# Patient Record
Sex: FEMALE | Race: WHITE | NOT HISPANIC OR LATINO | Employment: OTHER | URBAN - METROPOLITAN AREA
[De-identification: names, ages, dates, MRNs, and addresses within clinical notes are randomized per-mention and may not be internally consistent; named-entity substitution may affect disease eponyms.]

---

## 2017-11-22 ENCOUNTER — OFFICE VISIT (OUTPATIENT)
Dept: URGENT CARE | Facility: CLINIC | Age: 62
End: 2017-11-22
Payer: OTHER GOVERNMENT

## 2017-11-22 VITALS
SYSTOLIC BLOOD PRESSURE: 160 MMHG | TEMPERATURE: 98.4 F | OXYGEN SATURATION: 94 % | HEART RATE: 97 BPM | DIASTOLIC BLOOD PRESSURE: 74 MMHG | RESPIRATION RATE: 14 BRPM

## 2017-11-22 DIAGNOSIS — L30.9 DERMATITIS: ICD-10-CM

## 2017-11-22 PROCEDURE — 99203 OFFICE O/P NEW LOW 30 MIN: CPT | Performed by: NURSE PRACTITIONER

## 2017-11-22 RX ORDER — LEVOTHYROXINE SODIUM 0.07 MG/1
75 TABLET ORAL
Status: ON HOLD | COMMUNITY
End: 2023-08-08

## 2017-11-22 ASSESSMENT — ENCOUNTER SYMPTOMS
FATIGUE: 0
FEVER: 0
DIZZINESS: 0
SORE THROAT: 0
EYE PAIN: 0
NAUSEA: 0
MYALGIAS: 0
VOMITING: 0
DIARRHEA: 0
SHORTNESS OF BREATH: 0
CHILLS: 0

## 2017-11-22 NOTE — PROGRESS NOTES
Subjective:     Dovemonidavid El a 62 y.o. female who presents for Varicella (pt states she noticed a rash on arms last night and asked the pharmacist and said it was chickenpox and needs a doctors note for work)       Rash   This is a new problem. The current episode started in the past 7 days. The problem has been resolved since onset. The affected locations include the right arm and left arm. The rash is characterized by itchiness and redness. It is unknown if there was an exposure to a precipitant. Pertinent negatives include no diarrhea, eye pain, fatigue, fever, shortness of breath, sore throat or vomiting. Past treatments include anti-itch cream. The treatment provided significant relief.   History reviewed. No pertinent past medical history.  Past Surgical History:   Procedure Laterality Date   • ABDOMINAL HYSTERECTOMY TOTAL       Social History     Social History   • Marital status: Single     Spouse name: N/A   • Number of children: N/A   • Years of education: N/A     Occupational History   • Not on file.     Social History Main Topics   • Smoking status: Current Some Day Smoker   • Smokeless tobacco: Never Used   • Alcohol use Yes   • Drug use:      Types: Marijuana   • Sexual activity: Not Currently     Other Topics Concern   • Not on file     Social History Narrative   • No narrative on file    History reviewed. No pertinent family history. Review of Systems   Constitutional: Negative for chills, fatigue and fever.   HENT: Negative for sore throat.    Eyes: Negative for pain.   Respiratory: Negative for shortness of breath.    Cardiovascular: Negative for chest pain.   Gastrointestinal: Negative for diarrhea, nausea and vomiting.   Genitourinary: Negative for hematuria.   Musculoskeletal: Negative for myalgias.   Skin: Positive for itching and rash.   Neurological: Negative for dizziness.     Allergies   Allergen Reactions   • Codeine    • Iodine       Objective:   /74   Pulse 97    Temp 36.9 °C (98.4 °F)   Resp 14   SpO2 94%   Physical Exam   Constitutional: She is oriented to person, place, and time. She appears well-developed and well-nourished. No distress.   HENT:   Head: Normocephalic and atraumatic.   Eyes: Conjunctivae and EOM are normal. Pupils are equal, round, and reactive to light.   Cardiovascular: Normal rate and regular rhythm.    No murmur heard.  Pulmonary/Chest: Effort normal and breath sounds normal. No respiratory distress.   Abdominal: Soft. She exhibits no distension. There is no tenderness.   Neurological: She is alert and oriented to person, place, and time. She has normal reflexes. No sensory deficit.   Skin: Skin is warm and dry.   Psychiatric: She has a normal mood and affect.         Assessment/Plan:   Assessment    1. Dermatitis  Patient was at work with erythema, pruritic rash on bilateral forearms. She is unknown exposure possible food possible lotion allergy. Patient states that it has happened in the past and resolved with anti-itch cream. Patient was working) evaluated thinking it was possible chickenpox. Patient had chickenpox as a child. She is not immunized against shingles. Rash is completely resolved. Letter provided for work.    Patient advised to monitor blood pressure. Patient advised to keep log. Patient advised to follow up with primary if blood pressure stays elevated.    Patient given precautionary s/sx that mandate immediate follow up and evaluation in the ED. Advised of risks of not doing so.  DDX, Supportive care, and indications for immediate follow-up discussed with patient.    Instructed to return to clinic or nearest emergency department if we are not available for any change in condition, further concerns, or worsening of symptoms.  The patient demonstrated a good understanding and agreed with the treatment plan.

## 2017-11-22 NOTE — LETTER
November 22, 2017         Patient: Rena Pacheco   YOB: 1955   Date of Visit: 11/22/2017           To Whom it May Concern:    Rena Pacheco was seen in my clinic on 11/22/2017. She may return to work on 11/27/17.    If you have any questions or concerns, please don't hesitate to call.        Sincerely,           JADEN Miller.  Electronically Signed

## 2021-03-25 ENCOUNTER — OFFICE VISIT (OUTPATIENT)
Dept: URGENT CARE | Facility: PHYSICIAN GROUP | Age: 66
End: 2021-03-25
Payer: OTHER GOVERNMENT

## 2021-03-25 ENCOUNTER — HOSPITAL ENCOUNTER (OUTPATIENT)
Dept: RADIOLOGY | Facility: MEDICAL CENTER | Age: 66
End: 2021-03-25
Attending: NURSE PRACTITIONER
Payer: OTHER GOVERNMENT

## 2021-03-25 VITALS
SYSTOLIC BLOOD PRESSURE: 162 MMHG | DIASTOLIC BLOOD PRESSURE: 82 MMHG | OXYGEN SATURATION: 94 % | TEMPERATURE: 97.8 F | HEART RATE: 117 BPM

## 2021-03-25 DIAGNOSIS — S46.912A SHOULDER STRAIN, LEFT, INITIAL ENCOUNTER: ICD-10-CM

## 2021-03-25 PROCEDURE — 99203 OFFICE O/P NEW LOW 30 MIN: CPT | Performed by: NURSE PRACTITIONER

## 2021-03-25 PROCEDURE — 73030 X-RAY EXAM OF SHOULDER: CPT | Mod: LT

## 2021-03-25 RX ORDER — CYCLOBENZAPRINE HCL 10 MG
10 TABLET ORAL 3 TIMES DAILY PRN
Qty: 30 TABLET | Refills: 0 | Status: SHIPPED | OUTPATIENT
Start: 2021-03-25 | End: 2023-08-02

## 2021-03-25 RX ORDER — CYCLOBENZAPRINE HCL 10 MG
10 TABLET ORAL 3 TIMES DAILY PRN
Qty: 30 TABLET | Refills: 0 | Status: SHIPPED
Start: 2021-03-25 | End: 2021-03-25 | Stop reason: CLARIF

## 2021-03-26 NOTE — PROGRESS NOTES
Subjective:      Rena Pacheco is a 66 y.o. female who presents with No chief complaint on file.    History reviewed. No pertinent past medical history.  Social History     Socioeconomic History   • Marital status: Single     Spouse name: Not on file   • Number of children: Not on file   • Years of education: Not on file   • Highest education level: Not on file   Occupational History   • Not on file   Tobacco Use   • Smoking status: Current Some Day Smoker   • Smokeless tobacco: Never Used   Substance and Sexual Activity   • Alcohol use: Yes   • Drug use: Yes     Types: Marijuana   • Sexual activity: Not Currently   Other Topics Concern   • Not on file   Social History Narrative   • Not on file     Social Determinants of Health     Financial Resource Strain:    • Difficulty of Paying Living Expenses:    Food Insecurity:    • Worried About Running Out of Food in the Last Year:    • Ran Out of Food in the Last Year:    Transportation Needs:    • Lack of Transportation (Medical):    • Lack of Transportation (Non-Medical):    Physical Activity:    • Days of Exercise per Week:    • Minutes of Exercise per Session:    Stress:    • Feeling of Stress :    Social Connections:    • Frequency of Communication with Friends and Family:    • Frequency of Social Gatherings with Friends and Family:    • Attends [employer] Services:    • Active Member of Clubs or Organizations:    • Attends Club or Organization Meetings:    • Marital Status:    Intimate Partner Violence:    • Fear of Current or Ex-Partner:    • Emotionally Abused:    • Physically Abused:    • Sexually Abused:      History reviewed. No pertinent family history.    Allergies: Codeine and Iodine    Patient is a 66-year-old female who presents today with complaint of acute pain to left shoulder with decreased range of motion.  Onset of symptoms was 2 days ago.  Patient states she spilled some soup on the side of her stove and she wanted to clean it off the next  day.  She attempted to pull the stove away from the wall and felt a sharp pain in her left shoulder.  She has been having increased pain and decreased range of motion since.  States that the pain has been keeping her from sleeping at night.        Other  This is a new problem. The problem occurs constantly. The problem has been unchanged. Nothing aggravates the symptoms. She has tried nothing for the symptoms. The treatment provided no relief.       Review of Systems   Musculoskeletal:        Shoulder pain   All other systems reviewed and are negative.         Objective:     BP (!) 162/82 (BP Location: Right arm, Patient Position: Sitting, BP Cuff Size: Adult)   Pulse (!) 117   Temp 36.6 °C (97.8 °F) (Temporal)   SpO2 94%      Physical Exam  Vitals reviewed.   Constitutional:       Appearance: Normal appearance.   Musculoskeletal:        Arms:       Comments: Point tenderness to the left posterior shoulder.  Also point tenderness to the left anterior shoulder.  Patient is unable to abduct greater than 20 degrees anteriorly or laterally.  She reports pain with all ranges of motion.   Skin:     General: Skin is warm and dry.   Neurological:      General: No focal deficit present.      Mental Status: She is alert and oriented to person, place, and time.   Psychiatric:         Mood and Affect: Mood normal.         Behavior: Behavior normal.         Thought Content: Thought content normal.         Judgment: Judgment normal.       X-ray, shoulder:    IMPRESSION:     1.  Old healed LEFT proximal humeral shaft fracture.  2.  No acute fracture or dislocation of LEFT shoulder.  3.  Moderate degenerative changes.          Assessment/Plan:   Left shoulder strain    Flexeril as needed  Ice as needed  Rest  Order given for MRI; patient may call to schedule if she has persistent pain  Return to urgent care otherwise for any further questions or concerns     There are no diagnoses linked to this encounter.

## 2023-07-25 ENCOUNTER — HOSPITAL ENCOUNTER (OUTPATIENT)
Dept: RADIOLOGY | Facility: MEDICAL CENTER | Age: 68
End: 2023-07-25
Attending: NURSE PRACTITIONER
Payer: MEDICARE

## 2023-07-25 ENCOUNTER — HOSPITAL ENCOUNTER (OUTPATIENT)
Dept: RADIOLOGY | Facility: MEDICAL CENTER | Age: 68
End: 2023-07-25
Attending: INTERNAL MEDICINE
Payer: MEDICARE

## 2023-07-25 ENCOUNTER — PATIENT OUTREACH (OUTPATIENT)
Dept: OTHER | Facility: MEDICAL CENTER | Age: 68
End: 2023-07-25
Payer: MEDICARE

## 2023-07-25 DIAGNOSIS — Z65.8 PSYCHOSOCIAL DISTRESS: ICD-10-CM

## 2023-07-25 NOTE — LETTER
[facility]    [address]  [place] [address]  Phone: [phone] - Fax: [phone]              Rena Jania Pacheco  [address]  [place] [address]     Date: 07/25/23  Medical Record Number: [id number]    Dear Rena,    I am a Cancer Nurse Navigator, a certified oncology nurse. My role is to assess any needs you may have with education, guidance and support. I am available to you and your family through your treatment at [facility].       I am available to address your needs during your journey with the following services:     Care Coordination  I can assist you in facilitating communication between your cancer care treatment team to ensure timely treatment and follow-up.  I can also assist with transition of care back to your primary care provider, or other specialist, as needed.  My goal is to bridge gaps for you throughout the course of your active treatment.       Education Services  Understanding the recommended treatment course by your physician is key. I can provide educational resources personalized to your cancer diagnosis to help you understand your diagnosis and treatment. Please let me know if you would like to receive information about your diagnosis and treatment plan.  I am here to help.     Support Services/Resource Information  [facility] we offer a full scope of support services.  I can assist you with referral information to:  Cancer Clinical Trials & Research  Nutrition counseling  Support groups  Complementary Therapies such as Mind-Body Techniques Meditation  Patient Financial Advocates    [facility], an [employer] affiliate office, our volunteers can assist you with accessing our [employer] library, support services information, head coverings and comfort items  Community and national resources, included eligibility based juaquin assistance and pharmaceutical access programs, if you are in need  of additional information.     [employer] [facility] offers services that include:  Behavioral Health  Genetic counseling & testing  Lymphedema prevention/treatment program  Palliative care services.        I hope you have an excellent patient experience.  Please feel free to share with me your comments regarding the care you have received- we value your feedback.    Sincerely,     Katie Tao R.N.  Cancer Nurse Navigator    Office: [phone] / [phone]   Email:  [email or web address]    If you would like to attend our Breast Cancer Newly Diagnosed Class please call [phone] or visit [email or web address] to register.

## 2023-07-25 NOTE — PROGRESS NOTES
"Oncology Nurse Navigation Assessment  Intro call to pt.  Pt states she is scheduled for surgery on 8/8/23 with Dr. Brown.      DX: invasive ductal carcinoma of right breast  HR+ / HER2 pending    POC: partial mastectomy  / SNB    FAMHX: Cancer-related family history is not on file.             BARRIERS ASSESSMENT:    TRANSPORTATION: denies barriers.  States she has a friend to pick her up day of surgery.  EMPLOYMENT:  retired   FINANCIAL:  denies barriers  INSURANCE:  Medicare / tab ticketbroker  SUPPORT SYSTEM:  friend lives local, family lives in [place]  PSYCHOLOGICAL: distress screening 6/10 \"my son has cut me off.  He does not believe I have cancer.\"  Discussed counseling.   COMMUNICATION: denies barriers    FAMILY CARE:  denies barriers  SELF CARE:  denies barriers         INTERVENTION:  Intro letter and resources mailed.  Referral to Oncology Social Worker for counseling resources.       "

## 2023-07-27 ENCOUNTER — APPOINTMENT (OUTPATIENT)
Dept: ADMISSIONS | Facility: MEDICAL CENTER | Age: 68
End: 2023-07-27
Attending: SURGERY
Payer: MEDICARE

## 2023-07-28 ENCOUNTER — HOSPITAL ENCOUNTER (OUTPATIENT)
Facility: MEDICAL CENTER | Age: 68
End: 2023-07-28
Attending: SURGERY
Payer: MEDICARE

## 2023-08-02 ENCOUNTER — PRE-ADMISSION TESTING (OUTPATIENT)
Dept: ADMISSIONS | Facility: MEDICAL CENTER | Age: 68
End: 2023-08-02
Attending: SURGERY
Payer: MEDICARE

## 2023-08-02 ENCOUNTER — HOSPITAL ENCOUNTER (OUTPATIENT)
Dept: RADIOLOGY | Facility: MEDICAL CENTER | Age: 68
End: 2023-08-02

## 2023-08-02 LAB — PATHOLOGY CONSULT NOTE: NORMAL

## 2023-08-04 ENCOUNTER — HOSPITAL ENCOUNTER (OUTPATIENT)
Dept: RADIOLOGY | Facility: MEDICAL CENTER | Age: 68
End: 2023-08-04
Attending: SURGERY
Payer: MEDICARE

## 2023-08-04 ENCOUNTER — PATIENT OUTREACH (OUTPATIENT)
Dept: ONCOLOGY | Facility: MEDICAL CENTER | Age: 68
End: 2023-08-04
Payer: MEDICARE

## 2023-08-04 DIAGNOSIS — C50.411 MALIGNANT NEOPLASM OF UPPER-OUTER QUADRANT OF RIGHT FEMALE BREAST, UNSPECIFIED ESTROGEN RECEPTOR STATUS (HCC): ICD-10-CM

## 2023-08-04 PROCEDURE — 19285 PERQ DEV BREAST 1ST US IMAG: CPT | Mod: RT

## 2023-08-04 NOTE — PROGRESS NOTES
On August 4, 2023, Oncology Social Worker Shona Baca contacted pt. via telephone.  OSW Keven introduced herself to pt. and reason for call.  Pt. shared she had forgotten about [employer] [place] [employer] traffic and is trying to get to her appointment with the [facility].  OSW Keven encouraged pt. to call Breast Center to let them know.  OSW Keven explained her reason for call.  Pt. stated she was driving and couldn't talk.

## 2023-08-08 ENCOUNTER — HOSPITAL ENCOUNTER (OUTPATIENT)
Facility: MEDICAL CENTER | Age: 68
End: 2023-08-08
Attending: SURGERY | Admitting: SURGERY
Payer: MEDICARE

## 2023-08-08 ENCOUNTER — APPOINTMENT (OUTPATIENT)
Dept: RADIOLOGY | Facility: MEDICAL CENTER | Age: 68
End: 2023-08-08
Attending: SURGERY
Payer: MEDICARE

## 2023-08-08 ENCOUNTER — ANESTHESIA EVENT (OUTPATIENT)
Dept: SURGERY | Facility: MEDICAL CENTER | Age: 68
End: 2023-08-08
Payer: MEDICARE

## 2023-08-08 ENCOUNTER — ANESTHESIA (OUTPATIENT)
Dept: SURGERY | Facility: MEDICAL CENTER | Age: 68
End: 2023-08-08
Payer: MEDICARE

## 2023-08-08 VITALS
WEIGHT: 149.69 LBS | HEART RATE: 80 BPM | HEIGHT: 62 IN | BODY MASS INDEX: 27.55 KG/M2 | SYSTOLIC BLOOD PRESSURE: 145 MMHG | RESPIRATION RATE: 18 BRPM | DIASTOLIC BLOOD PRESSURE: 84 MMHG | TEMPERATURE: 98.6 F | OXYGEN SATURATION: 95 %

## 2023-08-08 DIAGNOSIS — G89.18 POSTOPERATIVE PAIN: ICD-10-CM

## 2023-08-08 DIAGNOSIS — C50.411 MALIGNANT NEOPLASM OF UPPER-OUTER QUADRANT OF RIGHT FEMALE BREAST, UNSPECIFIED ESTROGEN RECEPTOR STATUS (HCC): ICD-10-CM

## 2023-08-08 LAB
ERYTHROCYTE [DISTWIDTH] IN BLOOD BY AUTOMATED COUNT: 48.7 FL (ref 35.9–50)
HCT VFR BLD AUTO: 46.6 % (ref 37–47)
HGB BLD-MCNC: 16 G/DL (ref 12–16)
MCH RBC QN AUTO: 33.1 PG (ref 27–33)
MCHC RBC AUTO-ENTMCNC: 34.3 G/DL (ref 32.2–35.5)
MCV RBC AUTO: 96.3 FL (ref 81.4–97.8)
PATHOLOGY CONSULT NOTE: NORMAL
PLATELET # BLD AUTO: 264 K/UL (ref 164–446)
PMV BLD AUTO: 9.6 FL (ref 9–12.9)
RBC # BLD AUTO: 4.84 M/UL (ref 4.2–5.4)
WBC # BLD AUTO: 7.9 K/UL (ref 4.8–10.8)

## 2023-08-08 PROCEDURE — 700101 HCHG RX REV CODE 250: Performed by: SURGERY

## 2023-08-08 PROCEDURE — 38792 RA TRACER ID OF SENTINL NODE: CPT | Mod: RT

## 2023-08-08 PROCEDURE — 700111 HCHG RX REV CODE 636 W/ 250 OVERRIDE (IP): Mod: JZ | Performed by: ANESTHESIOLOGY

## 2023-08-08 PROCEDURE — 160002 HCHG RECOVERY MINUTES (STAT): Performed by: SURGERY

## 2023-08-08 PROCEDURE — 700101 HCHG RX REV CODE 250: Performed by: ANESTHESIOLOGY

## 2023-08-08 PROCEDURE — 160035 HCHG PACU - 1ST 60 MINS PHASE I: Performed by: SURGERY

## 2023-08-08 PROCEDURE — 160029 HCHG SURGERY MINUTES - 1ST 30 MINS LEVEL 4: Performed by: SURGERY

## 2023-08-08 PROCEDURE — 36415 COLL VENOUS BLD VENIPUNCTURE: CPT

## 2023-08-08 PROCEDURE — 700111 HCHG RX REV CODE 636 W/ 250 OVERRIDE (IP): Mod: JG | Performed by: SURGERY

## 2023-08-08 PROCEDURE — 160046 HCHG PACU - 1ST 60 MINS PHASE II: Performed by: SURGERY

## 2023-08-08 PROCEDURE — 160009 HCHG ANES TIME/MIN: Performed by: SURGERY

## 2023-08-08 PROCEDURE — 160025 RECOVERY II MINUTES (STATS): Performed by: SURGERY

## 2023-08-08 PROCEDURE — 76098 X-RAY EXAM SURGICAL SPECIMEN: CPT | Mod: RT

## 2023-08-08 PROCEDURE — 160041 HCHG SURGERY MINUTES - EA ADDL 1 MIN LEVEL 4: Performed by: SURGERY

## 2023-08-08 PROCEDURE — 700105 HCHG RX REV CODE 258: Mod: JZ | Performed by: SURGERY

## 2023-08-08 PROCEDURE — 160048 HCHG OR STATISTICAL LEVEL 1-5: Performed by: SURGERY

## 2023-08-08 PROCEDURE — A9270 NON-COVERED ITEM OR SERVICE: HCPCS | Performed by: SURGERY

## 2023-08-08 PROCEDURE — 700102 HCHG RX REV CODE 250 W/ 637 OVERRIDE(OP): Performed by: SURGERY

## 2023-08-08 PROCEDURE — 85027 COMPLETE CBC AUTOMATED: CPT

## 2023-08-08 PROCEDURE — 700102 HCHG RX REV CODE 250 W/ 637 OVERRIDE(OP): Performed by: ANESTHESIOLOGY

## 2023-08-08 PROCEDURE — 88307 TISSUE EXAM BY PATHOLOGIST: CPT

## 2023-08-08 PROCEDURE — A9270 NON-COVERED ITEM OR SERVICE: HCPCS | Performed by: ANESTHESIOLOGY

## 2023-08-08 PROCEDURE — 700111 HCHG RX REV CODE 636 W/ 250 OVERRIDE (IP): Performed by: ANESTHESIOLOGY

## 2023-08-08 PROCEDURE — 88342 IMHCHEM/IMCYTCHM 1ST ANTB: CPT

## 2023-08-08 RX ORDER — MIDAZOLAM HYDROCHLORIDE 1 MG/ML
INJECTION INTRAMUSCULAR; INTRAVENOUS PRN
Status: DISCONTINUED | OUTPATIENT
Start: 2023-08-08 | End: 2023-08-08 | Stop reason: SURG

## 2023-08-08 RX ORDER — SODIUM CHLORIDE, SODIUM LACTATE, POTASSIUM CHLORIDE, CALCIUM CHLORIDE 600; 310; 30; 20 MG/100ML; MG/100ML; MG/100ML; MG/100ML
INJECTION, SOLUTION INTRAVENOUS CONTINUOUS
Status: DISCONTINUED | OUTPATIENT
Start: 2023-08-08 | End: 2023-08-08 | Stop reason: HOSPADM

## 2023-08-08 RX ORDER — BUPIVACAINE HYDROCHLORIDE AND EPINEPHRINE 5; 5 MG/ML; UG/ML
INJECTION, SOLUTION EPIDURAL; INTRACAUDAL; PERINEURAL
Status: DISCONTINUED
Start: 2023-08-08 | End: 2023-08-08 | Stop reason: HOSPADM

## 2023-08-08 RX ORDER — HYDROMORPHONE HYDROCHLORIDE 1 MG/ML
0.1 INJECTION, SOLUTION INTRAMUSCULAR; INTRAVENOUS; SUBCUTANEOUS
Status: DISCONTINUED | OUTPATIENT
Start: 2023-08-08 | End: 2023-08-08 | Stop reason: HOSPADM

## 2023-08-08 RX ORDER — CEFAZOLIN SODIUM 1 G/3ML
INJECTION, POWDER, FOR SOLUTION INTRAMUSCULAR; INTRAVENOUS PRN
Status: DISCONTINUED | OUTPATIENT
Start: 2023-08-08 | End: 2023-08-08 | Stop reason: SURG

## 2023-08-08 RX ORDER — HALOPERIDOL 5 MG/ML
1 INJECTION INTRAMUSCULAR
Status: DISCONTINUED | OUTPATIENT
Start: 2023-08-08 | End: 2023-08-08 | Stop reason: HOSPADM

## 2023-08-08 RX ORDER — OXYCODONE HCL 5 MG/5 ML
10 SOLUTION, ORAL ORAL
Status: COMPLETED | OUTPATIENT
Start: 2023-08-08 | End: 2023-08-08

## 2023-08-08 RX ORDER — ISOSULFAN BLUE 50 MG/5ML
INJECTION, SOLUTION SUBCUTANEOUS
Status: DISCONTINUED | OUTPATIENT
Start: 2023-08-08 | End: 2023-08-08 | Stop reason: HOSPADM

## 2023-08-08 RX ORDER — HYDROMORPHONE HYDROCHLORIDE 1 MG/ML
0.2 INJECTION, SOLUTION INTRAMUSCULAR; INTRAVENOUS; SUBCUTANEOUS
Status: DISCONTINUED | OUTPATIENT
Start: 2023-08-08 | End: 2023-08-08 | Stop reason: HOSPADM

## 2023-08-08 RX ORDER — ALPRAZOLAM 0.25 MG/1
.25-.5 TABLET ORAL
Status: COMPLETED | OUTPATIENT
Start: 2023-08-08 | End: 2023-08-08

## 2023-08-08 RX ORDER — ONDANSETRON 2 MG/ML
INJECTION INTRAMUSCULAR; INTRAVENOUS PRN
Status: DISCONTINUED | OUTPATIENT
Start: 2023-08-08 | End: 2023-08-08 | Stop reason: SURG

## 2023-08-08 RX ORDER — ISOSULFAN BLUE 50 MG/5ML
INJECTION, SOLUTION SUBCUTANEOUS
Status: DISCONTINUED
Start: 2023-08-08 | End: 2023-08-08 | Stop reason: HOSPADM

## 2023-08-08 RX ORDER — OXYCODONE HCL 5 MG/5 ML
5 SOLUTION, ORAL ORAL
Status: COMPLETED | OUTPATIENT
Start: 2023-08-08 | End: 2023-08-08

## 2023-08-08 RX ORDER — BUPIVACAINE HYDROCHLORIDE AND EPINEPHRINE 5; 5 MG/ML; UG/ML
INJECTION, SOLUTION EPIDURAL; INTRACAUDAL; PERINEURAL
Status: DISCONTINUED | OUTPATIENT
Start: 2023-08-08 | End: 2023-08-08 | Stop reason: HOSPADM

## 2023-08-08 RX ORDER — LIDOCAINE AND PRILOCAINE 25; 25 MG/G; MG/G
CREAM TOPICAL ONCE
Status: COMPLETED | OUTPATIENT
Start: 2023-08-08 | End: 2023-08-08

## 2023-08-08 RX ORDER — ONDANSETRON 2 MG/ML
4 INJECTION INTRAMUSCULAR; INTRAVENOUS
Status: COMPLETED | OUTPATIENT
Start: 2023-08-08 | End: 2023-08-08

## 2023-08-08 RX ORDER — ONDANSETRON 4 MG/1
4 TABLET, FILM COATED ORAL EVERY 8 HOURS PRN
Qty: 9 EACH | Refills: 0 | Status: SHIPPED | OUTPATIENT
Start: 2023-08-08 | End: 2023-08-11

## 2023-08-08 RX ORDER — ACETAMINOPHEN 500 MG
1000 TABLET ORAL ONCE
Status: COMPLETED | OUTPATIENT
Start: 2023-08-08 | End: 2023-08-08

## 2023-08-08 RX ORDER — OXYCODONE HYDROCHLORIDE 5 MG/1
5 TABLET ORAL EVERY 4 HOURS PRN
Qty: 12 TABLET | Refills: 0 | Status: SHIPPED | OUTPATIENT
Start: 2023-08-08 | End: 2023-08-11

## 2023-08-08 RX ORDER — DEXAMETHASONE SODIUM PHOSPHATE 4 MG/ML
INJECTION, SOLUTION INTRA-ARTICULAR; INTRALESIONAL; INTRAMUSCULAR; INTRAVENOUS; SOFT TISSUE PRN
Status: DISCONTINUED | OUTPATIENT
Start: 2023-08-08 | End: 2023-08-08 | Stop reason: SURG

## 2023-08-08 RX ORDER — LIDOCAINE HYDROCHLORIDE 20 MG/ML
INJECTION, SOLUTION EPIDURAL; INFILTRATION; INTRACAUDAL; PERINEURAL PRN
Status: DISCONTINUED | OUTPATIENT
Start: 2023-08-08 | End: 2023-08-08 | Stop reason: SURG

## 2023-08-08 RX ORDER — HYDROMORPHONE HYDROCHLORIDE 1 MG/ML
0.4 INJECTION, SOLUTION INTRAMUSCULAR; INTRAVENOUS; SUBCUTANEOUS
Status: DISCONTINUED | OUTPATIENT
Start: 2023-08-08 | End: 2023-08-08 | Stop reason: HOSPADM

## 2023-08-08 RX ORDER — PHENYLEPHRINE HCL IN 0.9% NACL 0.5 MG/5ML
SYRINGE (ML) INTRAVENOUS PRN
Status: DISCONTINUED | OUTPATIENT
Start: 2023-08-08 | End: 2023-08-08 | Stop reason: SURG

## 2023-08-08 RX ORDER — MEPERIDINE HYDROCHLORIDE 25 MG/ML
12.5 INJECTION INTRAMUSCULAR; INTRAVENOUS; SUBCUTANEOUS
Status: DISCONTINUED | OUTPATIENT
Start: 2023-08-08 | End: 2023-08-08 | Stop reason: HOSPADM

## 2023-08-08 RX ORDER — CELECOXIB 200 MG/1
400 CAPSULE ORAL ONCE
Status: COMPLETED | OUTPATIENT
Start: 2023-08-08 | End: 2023-08-08

## 2023-08-08 RX ADMIN — FENTANYL CITRATE 25 MCG: 50 INJECTION, SOLUTION INTRAMUSCULAR; INTRAVENOUS at 13:08

## 2023-08-08 RX ADMIN — Medication 100 MCG: at 12:15

## 2023-08-08 RX ADMIN — FENTANYL CITRATE 50 MCG: 50 INJECTION, SOLUTION INTRAMUSCULAR; INTRAVENOUS at 12:30

## 2023-08-08 RX ADMIN — PROPOFOL 20 MG: 10 INJECTION, EMULSION INTRAVENOUS at 12:36

## 2023-08-08 RX ADMIN — FENTANYL CITRATE 25 MCG: 50 INJECTION, SOLUTION INTRAMUSCULAR; INTRAVENOUS at 12:24

## 2023-08-08 RX ADMIN — FENTANYL CITRATE 25 MCG: 50 INJECTION, SOLUTION INTRAMUSCULAR; INTRAVENOUS at 14:01

## 2023-08-08 RX ADMIN — LIDOCAINE AND PRILOCAINE 1 APPLICATION: 25; 25 CREAM TOPICAL at 09:52

## 2023-08-08 RX ADMIN — FENTANYL CITRATE 25 MCG: 50 INJECTION, SOLUTION INTRAMUSCULAR; INTRAVENOUS at 13:16

## 2023-08-08 RX ADMIN — FENTANYL CITRATE 25 MCG: 50 INJECTION, SOLUTION INTRAMUSCULAR; INTRAVENOUS at 11:55

## 2023-08-08 RX ADMIN — OXYCODONE HYDROCHLORIDE 5 MG: 5 SOLUTION ORAL at 14:01

## 2023-08-08 RX ADMIN — FENTANYL CITRATE 25 MCG: 50 INJECTION, SOLUTION INTRAMUSCULAR; INTRAVENOUS at 12:36

## 2023-08-08 RX ADMIN — SODIUM CHLORIDE, POTASSIUM CHLORIDE, SODIUM LACTATE AND CALCIUM CHLORIDE: 600; 310; 30; 20 INJECTION, SOLUTION INTRAVENOUS at 13:20

## 2023-08-08 RX ADMIN — CEFAZOLIN 2 G: 1 INJECTION, POWDER, FOR SOLUTION INTRAMUSCULAR; INTRAVENOUS at 11:45

## 2023-08-08 RX ADMIN — MIDAZOLAM 2 MG: 1 INJECTION, SOLUTION INTRAMUSCULAR; INTRAVENOUS at 11:40

## 2023-08-08 RX ADMIN — FENTANYL CITRATE 50 MCG: 50 INJECTION, SOLUTION INTRAMUSCULAR; INTRAVENOUS at 11:45

## 2023-08-08 RX ADMIN — SODIUM CHLORIDE, POTASSIUM CHLORIDE, SODIUM LACTATE AND CALCIUM CHLORIDE: 600; 310; 30; 20 INJECTION, SOLUTION INTRAVENOUS at 11:39

## 2023-08-08 RX ADMIN — Medication 100 MCG: at 12:48

## 2023-08-08 RX ADMIN — DEXAMETHASONE SODIUM PHOSPHATE 8 MG: 4 INJECTION INTRA-ARTICULAR; INTRALESIONAL; INTRAMUSCULAR; INTRAVENOUS; SOFT TISSUE at 11:50

## 2023-08-08 RX ADMIN — CELECOXIB 400 MG: 200 CAPSULE ORAL at 11:11

## 2023-08-08 RX ADMIN — FENTANYL CITRATE 25 MCG: 50 INJECTION, SOLUTION INTRAMUSCULAR; INTRAVENOUS at 12:17

## 2023-08-08 RX ADMIN — ACETAMINOPHEN 1000 MG: 500 TABLET, FILM COATED ORAL at 11:11

## 2023-08-08 RX ADMIN — PROPOFOL 120 MG: 10 INJECTION, EMULSION INTRAVENOUS at 11:45

## 2023-08-08 RX ADMIN — LIDOCAINE HYDROCHLORIDE 70 MG: 20 INJECTION, SOLUTION EPIDURAL; INFILTRATION; INTRACAUDAL at 11:45

## 2023-08-08 RX ADMIN — ONDANSETRON 4 MG: 2 INJECTION INTRAMUSCULAR; INTRAVENOUS at 14:57

## 2023-08-08 RX ADMIN — ALPRAZOLAM 0.5 MG: 0.25 TABLET ORAL at 09:52

## 2023-08-08 RX ADMIN — Medication 100 MCG: at 13:00

## 2023-08-08 RX ADMIN — ONDANSETRON 4 MG: 2 INJECTION INTRAMUSCULAR; INTRAVENOUS at 12:49

## 2023-08-08 ASSESSMENT — PAIN DESCRIPTION - PAIN TYPE
TYPE: SURGICAL PAIN

## 2023-08-08 ASSESSMENT — PAIN SCALES - GENERAL: PAIN_LEVEL: 4

## 2023-08-08 NOTE — OR NURSING
1333 - received patient from OR. Report from Anesthesiologist and OR RN. Patient on 4 at 97 % O2. VSS. Monitor connected. S/P right partial mastectomy, mastopexy with excision of axillary nodes, incision under breast binder CDI.    1338- Denies any pain or nausea at this time.     1350- RN updated friend, Eunice, at this time. All questions answered.     1401 Patient medicated for pain.    1430 Patient states pain tolerable, transitioned to room air.     1515 Updated Dr. Brown about changing pharmacy.    1525 Patient escorted out to responsible adult via RN by wheelchair. Belongings with patient, ambulated with steady gait. Discharge paperwork and instructions reviewed, signed, and sent with patient.

## 2023-08-08 NOTE — ANESTHESIA TIME REPORT
Anesthesia Start and Stop Event Times     Date Time Event    8/8/2023 1123 Ready for Procedure     1139 Anesthesia Start     1335 Anesthesia Stop        Responsible Staff  08/08/23    Name Role Begin End    Thuy Hinson M.D. Anesth 1139 1332        Overtime Reason:  no overtime (within assigned shift)    Comments:                                                       no strength deficits were identified

## 2023-08-08 NOTE — OP REPORT
Operative Report    Date: 8/8/2023      Pre-operative Diagnosis: Malignant neoplasm right upper outer breast    Post-operative Diagnosis: Same    Procedure:   NUPUR loc right partial mastectomy with lattice  Donut mastopexy  Right sentinel lymph node mapping with axillary reverse mapping and excision of deep axillary nodes      Surgeon: Natalie Brown M.D.    Assistant: None    Anesthesiologist: Thuy Hinson MD      Anesthesia:  General    Pre-Op Meds:  Ancef    ASA class:  2    Indications:   This is a 68 year old female with right breast cancer, cT1 cN0 M0.  After discussing risks and benefits of her options, she is ready to proceed with surgery.    Findings:   Two sentinel nodes, counts to 1800.  No blue arm lymphatics encountered.    NUPUR and nodule present.  Within 2mm of posterior margin (slightly superior aspect), re-excised with suture marking new margin.      Summary:   The patient underwent radiotracer injection by Radiology.  She was taken to the operating room and anesthetized, then prepped and draped in sterile fashion.  Time out was confirmed.  Local anesthetic was injected prior to all incisions.  For axillary reverse mapping, 3cc of isosulfan blue dye and 7cc of saline was injected into the upper inner arm, and lymphatic massage was performed.    I started with a small axillary incision following her skin lines.  The clavipectoral fascia was incised and the gamma probe was used to locate the sentinel nodes which were excised using the harmonic scalpel and suture ligation to reapproximate the efferent and afferent lymphatics.   No blue arm lymphatics were encountered.   Background counts were less than 10% and there were no suspicious palpable nodes.  I irrigated and ensured hemostasis. I closed the fascia and deep dermal layer, then 4-0 monocryl was used for skin.      I then made a circumareolar incision, followed by a wider concentric incision.  I de-epithelialized in between, then dissected  along the anterior mammary fascial plane to the area in the far posterior upper outer breast.  I excised widely around the area of interest, then placed usual orienting sutures.  Specimen mammogram was performed.     Pathology evaluated the specimen grossly for margins.  Additional posterior margin was excised, in the area lateral to the pectoralis edge.  I irrigated and ensured hemostasis.  Small clips were placed at the edges of the cavity.  I used a 2-0 PDS to create a two layer lattice.      The larger outer incision was advanced with adjacent vascularized skin to the inner incision to decrease the breast envelope and minimize skin rippling.  3-0 vicryls were used for buried interrupted, and 4-0 Fiberwire was used for purse string at the deep dermal layer.  4-0 Stratafix was used for skin.  Dressings were placed and I was informed all counts were correct.       Clawson Node Biopsy for Breast Cancer  Operation performed with curative intent Yes   Tracer(s) used to identify sentinel nodes in the upfront surgery (non-neoadjuvant) setting Radioactive tracer   Tracer(s) used to identify sentinel nodes in the neoadjuvant setting N/A   All nodes (colored or non-colored) present at the end of a dye-filled lymphatic channel were removed N/A   All significantly radioactive nodes were removed Yes   All palpably suspicious nodes were removed N/A   Biopsy-proven positive nodes marked with clips prior to chemotherapy were identified and removed N/A          CC:   IRENE Rascon

## 2023-08-08 NOTE — ANESTHESIA PREPROCEDURE EVALUATION
Case: [id number] Anesthesia Start Date/Time: 08/08/23 1139    Procedures:       NUPUR LOCALIZED RIGHT PARTIAL MASTECTOMY, POSSIBLE MASTOPEXY, RIGHT SENTINEL LYMPH NODE INJECTION WITH EXCISION OF AXILLARY NODES      MASTOPEXY      BIOPSY, LYMPH NODE, SENTINEL    Pre-op diagnosis: RIGHT BREAST CANCER    Location: [facility] ROOM 28 / SURGERY SAME DAY [facility]    Surgeons: Natalie Brown M.D.        67 yo w/right breast cancer    Relevant Problems   ANESTHESIA (within normal limits)     (+) marijuana smoker   Occ GERD- food associated    Physical Exam    Airway   Mallampati: II  TM distance: >3 FB  Neck ROM: full       Cardiovascular   Rhythm: regular  Rate: normal  (-) murmur     Dental - normal exam           Pulmonary   Breath sounds clear to auscultation     Abdominal    Neurological - normal exam                 Anesthesia Plan    ASA 2       Plan - general       Airway plan will be LMA          Induction: intravenous    Postoperative Plan: Postoperative administration of opioids is intended.    Pertinent diagnostic labs and testing reviewed    Informed Consent:    Anesthetic plan and risks discussed with patient.    Use of blood products discussed with: patient whom consented to blood products.

## 2023-08-08 NOTE — ANESTHESIA POSTPROCEDURE EVALUATION
Patient: Cheri Pacheco    Procedure Summary     Date: 08/08/23 Room / Location: [facility] ROOM 28 / SURGERY SAME DAY [facility]    Anesthesia Start: 1139 Anesthesia Stop: 1335    Procedures:       NUPUR LOCALIZED RIGHT PARTIAL MASTECTOMY, MASTOPEXY, RIGHT SENTINEL LYMPH NODE INJECTION WITH EXCISION OF AXILLARY NODES (Right: Breast)      MASTOPEXY (Right: Breast)      BIOPSY, LYMPH NODE, SENTINEL (Right: Axilla) Diagnosis: (RIGHT BREAST NEOPLASM MALIGNANT FEMALE UPPER OUTER QUADRANT)    Surgeons: Natalie Brown M.D. Responsible Provider: Thuy Hinson M.D.    Anesthesia Type: general ASA Status: 2          Final Anesthesia Type: general  Last vitals  BP   Blood Pressure : 136/66    Temp   37 °C (98.6 °F)    Pulse   67   Resp   20    SpO2   93 %      Anesthesia Post Evaluation    Patient location during evaluation: PACU  Patient participation: complete - patient participated  Level of consciousness: awake  Pain score: 4    Airway patency: patent  Cardiovascular status: hemodynamically stable  Respiratory status: acceptable  Hydration status: acceptable    PONV: none          No notable events documented.     Nurse Pain Score: 4 (NPRS)

## 2023-08-08 NOTE — DISCHARGE INSTRUCTIONS
If any questions arise, call your provider.  If your provider is not available, please feel free to call the Surgical Center at [phone].    MEDICATIONS: Resume taking daily medication.  Take prescribed pain medication with food.  If no medication is prescribed, you may take non-aspirin pain medication if needed.  PAIN MEDICATION CAN BE VERY CONSTIPATING.  Take a stool softener or laxative such as senokot, pericolace, or milk of magnesia if needed.    Last pain medication given at Oxycodone at 2:00pm    What to Expect Post Anesthesia    Rest and take it easy for the first 24 hours.  A responsible adult is recommended to remain with you during that time.  It is normal to feel sleepy.  We encourage you to not do anything that requires balance, judgment or coordination.    FOR 24 HOURS DO NOT:  Drive, operate machinery or run household appliances.  Drink beer or alcoholic beverages.  Make important decisions or sign legal documents.    To avoid nausea, slowly advance diet as tolerated, avoiding spicy or greasy foods for the first day.  Add more substantial food to your diet according to your provider's instructions.  Babies can be fed formula or breast milk as soon as they are hungry.  INCREASE FLUIDS AND FIBER TO AVOID CONSTIPATION.    MILD FLU-LIKE SYMPTOMS ARE NORMAL.  YOU MAY EXPERIENCE GENERALIZED MUSCLE ACHES, THROAT IRRITATION, HEADACHE AND/OR SOME NAUSEA.    Discharge Instructions:  -May shower with dressing in place, but no baths for four weeks.  Keep dressing in place until follow up  -Wear sports/compression bra , then wear binder over sports bra for compression 24/7 except when showering.    -Walk frequently.    -Avoid strenuous arm use.    -DO NOT DRINK ALCOHOL WHILE USING NARCOTICS.    -Follow Dr. Brown's post op instructions provided at preop appointment.    Mastectomy Discharge Instructions    Incision and Dressing Care:    Your incision, or scar, has both stitches and steri-strips, which are small  "white strips of tape, and is covered by a gauze dressing and tape or a plastic dressing.  Do not remove the dressing, steri-strips or stitches. We will remove the dressing in seven to 10 days at your follow up. We also will remove the sutures in one to two weeks unless they absorb on their own.     Bruising and some swelling are common in women after surgery.     A low-grade fever that is under 100 degrees Fahrenheit is normal the day after surgery.     Activity:    Avoid strenuous activity, heavy lifting and vigorous exercise until the stitches are removed. Tell your caregiver what you do and they will help you make a personal plan for \"what you can do when\" after surgery. Walking is a normal activity that can be restarted right away. You cannot do housework or driving until the drain is out.     You may restart driving when you are no longer on narcotics and you feel safe turning the wheel and stopping quickly.     "

## 2023-08-08 NOTE — ANESTHESIA PROCEDURE NOTES
Airway    Date/Time: 8/8/2023 11:47 AM    Performed by: Thuy Hinson M.D.  Authorized by: Thuy Hinson M.D.    Location:  OR  Urgency:  Elective  Indications for Airway Management:  Anesthesia      Spontaneous Ventilation: absent    Sedation Level:  Deep  Preoxygenated: Yes    Mask Difficulty Assessment:  0 - not attempted  Final Airway Type:  Supraglottic airway  Final Supraglottic Airway:  Standard LMA    SGA Size:  4  Number of Attempts at Approach:  1

## 2023-08-17 PROBLEM — Z17.0 CARCINOMA OF UPPER-OUTER QUADRANT OF RIGHT BREAST IN FEMALE, ESTROGEN RECEPTOR POSITIVE (HCC): Status: ACTIVE | Noted: 2023-08-17

## 2023-08-17 PROBLEM — C50.411 CARCINOMA OF UPPER-OUTER QUADRANT OF RIGHT BREAST IN FEMALE, ESTROGEN RECEPTOR POSITIVE (HCC): Status: ACTIVE | Noted: 2023-08-17

## 2023-08-18 ASSESSMENT — LIFESTYLE VARIABLES
TOBACCO_USE: NO
SMOKING_STATUS: NO

## 2023-08-21 ENCOUNTER — HOSPITAL ENCOUNTER (OUTPATIENT)
Dept: RADIATION ONCOLOGY | Facility: MEDICAL CENTER | Age: 68
End: 2023-08-31
Attending: RADIOLOGY
Payer: MEDICARE

## 2023-08-21 VITALS
WEIGHT: 154.54 LBS | SYSTOLIC BLOOD PRESSURE: 164 MMHG | OXYGEN SATURATION: 93 % | BODY MASS INDEX: 28.44 KG/M2 | HEART RATE: 99 BPM | DIASTOLIC BLOOD PRESSURE: 96 MMHG | HEIGHT: 62 IN

## 2023-08-21 DIAGNOSIS — Z17.0 CARCINOMA OF UPPER-OUTER QUADRANT OF RIGHT BREAST IN FEMALE, ESTROGEN RECEPTOR POSITIVE (HCC): ICD-10-CM

## 2023-08-21 DIAGNOSIS — C50.411 CARCINOMA OF UPPER-OUTER QUADRANT OF RIGHT BREAST IN FEMALE, ESTROGEN RECEPTOR POSITIVE (HCC): ICD-10-CM

## 2023-08-21 PROCEDURE — 99205 OFFICE O/P NEW HI 60 MIN: CPT | Performed by: RADIOLOGY

## 2023-08-21 PROCEDURE — 99214 OFFICE O/P EST MOD 30 MIN: CPT | Performed by: RADIOLOGY

## 2023-08-21 ASSESSMENT — PAIN SCALES - GENERAL: PAINLEVEL: 5=MODERATE PAIN

## 2023-08-21 NOTE — CT SIMULATION
PATIENT NAME Cheri Pacheco   PRIMARY PHYSICIAN Liana Mathur [id number]   REFERRING PHYSICIAN Natalie Brown M.D. 1955     Carcinoma of upper-outer quadrant of right breast in female, estrogen receptor positive (HCC)  Staging form: Breast, AJCC 8th Edition  - Pathologic stage from 8/17/2023: Stage IA (pT1b, pN0, cM0, G1, ER+, MN+, HER2-) - Signed by Rony Kirby M.D. on 8/17/2023  Stage prefix: Initial diagnosis  Histologic grading system: 3 grade system         Treatment Planning CT Simulation        Order Questions       Question Answer    Is this for a new course of treatment? Yes    Is this an Addendum? No    Implanted Device/Pacemaker No    Simulation Status Initial    Treatment Site Breast    Laterality Right    Treatment Technique 3D CRT    Other Technique(s) 3D    Treatment Pattern/Frequency Daily    Concurrent Chemotherapy No    CT Technique 3D    Slice Thickness 3mm    Scan Extent Chest    Bowel Preparation No    Treatment Device(s) Vac Clementine     Wing Board    Patient Attire Gown    Patient Position Supine    Patient Orientation Head First    Arm Position Up    Treatment Machine No preference    Treatment Image Guidance Ports    Frequency (ports) Weekly    Other Orders Weekly Physics Check                  Comments       15 fx no boost

## 2023-08-21 NOTE — CT SIMULATION
PATIENT NAME Cheri Pacheco   PRIMARY PHYSICIAN Liana Mathur [id number]   REFERRING PHYSICIAN Natalie Brown M.D. 1955     Carcinoma of upper-outer quadrant of right breast in female, estrogen receptor positive (HCC)  Staging form: Breast, AJCC 8th Edition  - Pathologic stage from 8/17/2023: Stage IA (pT1b, pN0, cM0, G1, ER+, DC+, HER2-) - Signed by Rony Kirby M.D. on 8/17/2023  Stage prefix: Initial diagnosis  Histologic grading system: 3 grade system         Treatment Planning CT Simulation      Order Questions     Question Answer    Is this for a new course of treatment? Yes    Is this an Addendum? No    Implanted Device/Pacemaker No    Simulation Status Initial    Treatment Site Breast    Laterality Right    Treatment Technique 3D CRT    Other Technique(s) 3D    Treatment Pattern/Frequency Daily    Concurrent Chemotherapy No    CT Technique 3D    Slice Thickness 3mm    Scan Extent Chest    Bowel Preparation No    Treatment Device(s) Vac Clementine     Wing Board    Patient Attire Gown    Patient Position Supine    Patient Orientation Head First    Arm Position Up    Treatment Machine No preference    Treatment Image Guidance Ports    Frequency (ports) Weekly    Other Orders Weekly Physics Check            Comments     15 fx no boost

## 2023-08-21 NOTE — PROGRESS NOTES
"Patient was seen today in clinic with Dr. Kirby for consultation.  Vitals signs and weight were obtained and pain assessment was completed.  Allergies and medications were reviewed with the patient.      Vitals/Pain:  Vitals:    08/21/23 0807   BP: (!) 164/96   BP Location: Left arm   Patient Position: Sitting   BP Cuff Size: Adult long   Pulse: 99   SpO2: 93%   Weight: 70.1 kg (154 lb 8.7 oz)   Height: 1.575 m (5' 2\")   Pain Score: 5=Moderate Pain  Pain Scale: 0-10  Pain Assessement: Initial  Pain Location, Orientation and Scale: Breast: Right : Acute : 5  What makes the pain better: Not requiring pain medication  What makes the pain worse: S/P partial mastectomy        Allergies:   Iodine contrast [diagnostic x-ray materials], Codeine, and Iodine    Current Medications:  No current outpatient medications on file.     No current facility-administered medications for this encounter.         PCP:  Kareen Savage R.N.   "

## 2023-08-21 NOTE — CONSULTS
RADIATION ONCOLOGY CONSULT    DATE OF SERVICE: 8/21/2023    IDENTIFICATION:  Stage IA ([id number]) G1 invasive ductal carcinoma of the right upper outer quadrant of breast ER/NM positive HER2/mark negative with a Ki-67 of 5% status post lumpectomy and sentinel lymph node biopsy on 8/8/2023.      HISTORY OF PRESENT ILLNESS: I had the pleasure of seeing Ms. Pacheco today in consultation at the request of Dr. Brown for her breast cancer.  Patient is a 68-year-old woman who by mammography was appreciated to have new focal asymmetry in the right upper outer quadrant of her breast.  On diagnostic mammogram and ultrasound this asymmetry persisted.  There was a hypoechoic 7 mm area in this location.  Biopsy showed an invasive ductal carcinoma with grade 1 features.  Tumor was ER/NM positive HER2/mark negative with a Ki-67 of 5%.  Patient elected to proceed with breast conservation surgery and had a lumpectomy and sentinel lymph node biopsy on 8/8/2023.  This confirmed an 8 mm invasive ductal carcinoma with grade 1 features.  There was some evidence of atypical ductal hyperplasia.  2 sentinel lymph nodes were negative for malignancy.  She meets with medical oncology later this afternoon.  She presents to me today to further discuss the role of radiation as a part of her breast conservation strategy.    PAST MEDICAL HISTORY:   Past Medical History:   Diagnosis Date    Adopted     Carcinoma of upper-outer quadrant of right breast, estrogen receptor positive (HCC)     Dental disorder     upper partial       PAST SURGICAL HISTORY:  Past Surgical History:   Procedure Laterality Date    PB MASTECTOMY, PARTIAL Right 8/8/2023    Procedure: NUPUR LOCALIZED RIGHT PARTIAL MASTECTOMY, MASTOPEXY, RIGHT SENTINEL LYMPH NODE INJECTION WITH EXCISION OF AXILLARY NODES;  Surgeon: Natalie Brown M.D.;  Location: SURGERY SAME DAY [facility];  Service: General    NM SUSPENSION OF BREAST Right 8/8/2023    Procedure: MASTOPEXY;  Surgeon: Natalie Brown M.D.;   "Location: SURGERY SAME DAY [facility];  Service: General    NODE BIOPSY SENTINEL Right 2023    Procedure: BIOPSY, LYMPH NODE, SENTINEL;  Surgeon: Natalie Brown M.D.;  Location: SURGERY SAME DAY [facility];  Service: General    ABDOMINAL HYSTERECTOMY TOTAL         GYNECOLOGICAL STATUS:  : 3, Para: 2, and Number of Interrupted Pregnancies: 1    HORMONE USE:  Post-menopause use < 1 years    CURRENT MEDICATIONS:  No current outpatient medications on file.     No current facility-administered medications for this encounter.       ALLERGIES:    Iodine contrast [diagnostic x-ray materials], Codeine, and Iodine    FAMILY HISTORY:    History reviewed. No pertinent family history.    SOCIAL HISTORY:    Social History     Tobacco Use    Smoking status: Former     Years: .5     Types: Cigarettes     Quit date:      Years since quitting: 10.6    Smokeless tobacco: Never   Vaping Use    Vaping Use: Never used   Substance Use Topics    Alcohol use: Yes     Comment: 2-4 per day    Drug use: Yes     Types: Marijuana, Inhaled     Comment: daily     Patient is retired from [employer], Assistant .   Lives with: Self    REVIEW OF SYSTEMS:  A complete review of systems was completed in patient's chart on 2023.  All are negative with relationship to this diagnosis with the exception of:  Patient is healing well from surgery, does not have any suspicious lesions in the breast or axilla, denies any acute areas of bony tenderness or deformity, denies any new headaches or neurologic symptoms     PHYSICAL EXAM:    Vitals:    23 0807   BP: (!) 164/96   BP Location: Left arm   Patient Position: Sitting   BP Cuff Size: Adult long   Pulse: 99   SpO2: 93%   Weight: 70.1 kg (154 lb 8.7 oz)   Height: 1.575 m (5' 2\")   Pain Score: 5=Moderate Pain        ECO= Fully active, able to carry on all pre-disease performance without restriction.    PAIN:  5  What makes the pain better: Not requiring pain medication.  What " makes the pain worse: S/P partial mastectomy   Pain controlled with current regimen: yes  Pain related to condition being seen here for: yes    GENERAL: No apparent distress.  HEENT:  Pupils are equal, round, and reactive to light.  Extraocular muscles   are intact. Sclerae nonicteric.  Conjunctivae pink.  Oral cavity, tongue   protrudes midline.   NECK:  Supple without evidence of thyromegaly.  NODES:  No peripheral adenopathy of the neck, supraclavicular fossa or axillae   bilaterally.  LUNGS:  Clear to ascultation bilaterally   HEART:  Regular rate and rhythm.  No murmur appreciated  BREAST: No suspicious lesions found in either breast or axilla.  ABDOMEN:  Soft. No evidence of hepatosplenomegaly.  Positive bowel sounds.  EXTREMITIES:  Without Edema.  NEUROLOGIC:  Cranial nerves II through XII were intact. Normal stance and gait motor and sensory grossly within normal limits       IMPRESSION:    Stage IA (Y3mA8T2) G1 invasive ductal carcinoma of the right upper outer quadrant of breast ER/VA positive HER2/mark negative with a Ki-67 of 5% status post lumpectomy and sentinel lymph node biopsy on 8/8/2023.      RECOMMENDATIONS:    I discussed the diagnosis, prognosis, and treatment options over a 1 hr 5 min time period, 95% of that time dedicated to ongoing treatment management.  I discussed with the patient the early stage favorable features of her tumor.  Her disease is consistent with luminal type a.  She will meet with medical oncology later today.  I went over the concept of endocrine therapy.  It would be unlikely that she requires an Oncotype score to assess chemotherapy risk given all of her favorable features.  Next we discussed the role of radiation.  We discussed lumpectomy with radiation versus mastectomy.  We discussed lumpectomy with and without radiation.  More specifically we went over the favorable subgroup and the omission of radiation.  She would be considered in the low risk group.  Patient does  not feel comfortable omitting radiation.  Therefore we discussed hypofractionated radiation without a boost.  She was given a simulation for September 11 at 9 AM.    We discussed the risks, benefits and side effects of treatment and the patient is amenable to treatment.  If patient has any questions or concerns, she should feel free to contact me.    Thank you for the opportunity to participate in her care.  If any questions or comments, please do not hesitate in calling.

## 2023-09-01 ENCOUNTER — HOSPITAL ENCOUNTER (OUTPATIENT)
Dept: RADIATION ONCOLOGY | Facility: MEDICAL CENTER | Age: 68
End: 2023-09-30
Attending: RADIOLOGY
Payer: MEDICARE

## 2023-09-11 ENCOUNTER — HOSPITAL ENCOUNTER (OUTPATIENT)
Dept: RADIATION ONCOLOGY | Facility: MEDICAL CENTER | Age: 68
End: 2023-09-11

## 2023-09-11 PROCEDURE — 77263 THER RADIOLOGY TX PLNG CPLX: CPT | Performed by: RADIOLOGY

## 2023-09-11 PROCEDURE — 77334 RADIATION TREATMENT AID(S): CPT | Performed by: RADIOLOGY

## 2023-09-11 PROCEDURE — 77290 THER RAD SIMULAJ FIELD CPLX: CPT | Mod: 26 | Performed by: RADIOLOGY

## 2023-09-11 PROCEDURE — 77334 RADIATION TREATMENT AID(S): CPT | Mod: 26 | Performed by: RADIOLOGY

## 2023-09-11 PROCEDURE — 77290 THER RAD SIMULAJ FIELD CPLX: CPT | Performed by: RADIOLOGY

## 2023-09-11 NOTE — RADIATION PLANNING NOTES
DATE OF SERVICE: 9/11/2023    DIAGNOSIS:  Carcinoma of upper-outer quadrant of right breast in female, estrogen receptor positive (HCC)  Staging form: Breast, AJCC 8th Edition  - Pathologic stage from 8/17/2023: Stage IA (pT1b, pN0, cM0, G1, ER+, KS+, HER2-) - Signed by Rony Kirby M.D. on 8/17/2023  Stage prefix: Initial diagnosis  Histologic grading system: 3 grade system       DATE OF SERVICE: 9/11/2023    TYPE OF SIMULATION: Breast       [x] Right    [] Left      GOAL OF TREATMENT:   [x] Curative  [] Palliative  [] Oligometastatic    COMPLEX:  [x] Complex Blocking   []Arcs  [] Custom Blocks  [] >3 Sites    PROCEDURE: Patient placed in supine position on wing board with VAC ELIER bag with appropriate slant to compensate for slope of chest wall.  Breast/chest wall borders marked CT scan obtained to contain entire volume of interest.      I have personally reviewed the relevant data, performed the target localization, and determined all relevant factors for this patient’s simulation.

## 2023-09-11 NOTE — RADIATION PLANNING NOTES
Clinical Treatment Planning Note    DATE OF SERVICE: 9/11/2023    DIAGNOSIS:  Carcinoma of upper-outer quadrant of right breast in female, estrogen receptor positive (HCC)  Staging form: Breast, AJCC 8th Edition  - Pathologic stage from 8/17/2023: Stage IA (pT1b, pN0, cM0, G1, ER+, OK+, HER2-) - Signed by Rony Kirby M.D. on 8/17/2023  Stage prefix: Initial diagnosis  Histologic grading system: 3 grade system         IMAGING REVIEWED:  [x] CT     [] MRI     [] PET/CT     [] BONE SCAN     [x] MAMMO     [] OTHER      TREATMENT INTENT:   [x] CURATIVE     [] MAINTENANCE     []  PALLIATIVE      []  SUPPORTIVE     []  PROPHYLACTIC     [] BENIGN     []  CONSOLIDATIVE      [] DEFINITIVE   []  OLOGIMETASTATIC      LINE OF TREATMENT:  [] ADJUVANT   [] DEFINITIVE   [] NEOADJUVANT   [] RE-TREATMENT      TECHNIQUE PLANNED:  [] IMRT   [x] 3D   [] SBRT   [] SRS/SRT   [] HDR   [] ELECTRON       IMRT JUSTIFICATION:  []   An immediately adjacent area has been previously irradiated and abutting portals must be established with high precision.    []  Dose escalation is planned to deliver radiation doses in excess of those commonly utilized for similar tumors with conventional treatment.    []  The target volume is concave or convex, and the critical normal tissues are within or around that convexity or concavity.    []  The target volume is in close proximity to critical structures that must be protected.    []  The volume of interest must be covered with narrow margins to adequately protect  immediately adjacent structures.      FIELDS & BLOCKING:  [x] COMPLEX BLOCKS     []  = 3 TX AREAS     []  ARCS     []  CUSTOM SHEILD        []  SIMPLE BLOCK      CHEMOTHERAPY:  []  CONCURRENT     []  INDUCTION     [] SEQUENTIAL     []  <30 DAYS FROM XRT      NOTES:    OAR CONSTRAINTS: (GUIDELINES ONLY NOT ABSOLUTE)    Target Prescribed Coverage   PTV 95% of PTV covered by 95% (cGy) of RX Dose       ASHLIE Goal   Max point dose PTV Eval <=110%    Contralateral Breast V5% < 2Gy   Ipsilateral Lung V15% < 20Gy   Ipsilateral Lung V35% < 10Gy   Ipsilateral Lung V50% < 5Gy   Contralateral Lung V10% < 5Gy   Heart (L Sided) V5% < 20Gy   *RTOG 1005, START-A, START-B

## 2023-09-13 ENCOUNTER — PATIENT OUTREACH (OUTPATIENT)
Dept: ONCOLOGY | Facility: MEDICAL CENTER | Age: 68
End: 2023-09-13
Payer: MEDICARE

## 2023-09-13 NOTE — PROGRESS NOTES
On September 13, 2023, Oncology Social Worker Shona Baca attempted telephone contact with pt. to follow up on psychosocial distress screening.  OSW Keven left voicemail message for pt. requesting pt. call back at earliest convenience.  OSW Keven left contact information in voicemail message.

## 2023-09-15 PROCEDURE — 77295 3-D RADIOTHERAPY PLAN: CPT | Mod: 26 | Performed by: RADIOLOGY

## 2023-09-15 PROCEDURE — 77300 RADIATION THERAPY DOSE PLAN: CPT | Performed by: RADIOLOGY

## 2023-09-15 PROCEDURE — 77295 3-D RADIOTHERAPY PLAN: CPT | Performed by: RADIOLOGY

## 2023-09-15 PROCEDURE — 77300 RADIATION THERAPY DOSE PLAN: CPT | Mod: 26 | Performed by: RADIOLOGY

## 2023-09-15 PROCEDURE — 77334 RADIATION TREATMENT AID(S): CPT | Mod: 26 | Performed by: RADIOLOGY

## 2023-09-15 PROCEDURE — 77334 RADIATION TREATMENT AID(S): CPT | Performed by: RADIOLOGY

## 2023-09-19 ENCOUNTER — PATIENT OUTREACH (OUTPATIENT)
Dept: OTHER | Facility: MEDICAL CENTER | Age: 68
End: 2023-09-19
Payer: MEDICARE

## 2023-09-25 ENCOUNTER — HOSPITAL ENCOUNTER (OUTPATIENT)
Dept: RADIATION ONCOLOGY | Facility: MEDICAL CENTER | Age: 68
End: 2023-09-25

## 2023-09-25 LAB
CHEMOTHERAPY INFUSION START DATE: NORMAL
CHEMOTHERAPY RECORDS: 2.67
CHEMOTHERAPY RECORDS: 4005
CHEMOTHERAPY RECORDS: NORMAL
CHEMOTHERAPY RX CANCER: NORMAL
DATE 1ST CHEMO CANCER: NORMAL
RAD ONC ARIA COURSE LAST TREATMENT DATE: NORMAL
RAD ONC ARIA COURSE TREATMENT ELAPSED DAYS: NORMAL
RAD ONC ARIA REFERENCE POINT DOSAGE GIVEN TO DATE: 2.67
RAD ONC ARIA REFERENCE POINT DOSAGE GIVEN TO DATE: 2.67
RAD ONC ARIA REFERENCE POINT ID: NORMAL
RAD ONC ARIA REFERENCE POINT ID: NORMAL
RAD ONC ARIA REFERENCE POINT SESSION DOSAGE GIVEN: 2.67
RAD ONC ARIA REFERENCE POINT SESSION DOSAGE GIVEN: 2.67

## 2023-09-25 PROCEDURE — 77280 THER RAD SIMULAJ FIELD SMPL: CPT | Mod: 26 | Performed by: RADIOLOGY

## 2023-09-25 PROCEDURE — 77412 RADIATION TX DELIVERY LVL 3: CPT | Performed by: RADIOLOGY

## 2023-09-25 PROCEDURE — 77280 THER RAD SIMULAJ FIELD SMPL: CPT | Performed by: RADIOLOGY

## 2023-09-25 NOTE — CT SIMULATION
DATE OF SERVICE: 9/25/2023    Radiation Therapy Episodes       Active Episodes       Radiation Therapy: 3D CRT    Radiation Therapy: 3D CRT                   Radiation Treatments         Plan Last Treated On Elapsed Days Fractions Treated Prescribed Fraction Dose (cGy) Prescribed Total Dose (cGy)    R_Breast 9/25/2023 0 @ [id number] 1 of 15 267 4,005                  Reference Point Last Treated On Elapsed Days Most Recent Session Dose (cGy) Total Dose (cGy)    R_Breast 9/25/2023 0 @ [id number] 267 267    R_Breast CP 9/25/2023 0 @ [id number] 267 267                            First Visit Simple Simulation: Called by [employer] machine to verify treatment parameters including:  treatment site, treatment dose, and treatment setup prior to first treatment. Image derived shifts reviewed in all appropriate planes.  Shifts approved.  Patient treated.    I have personally reviewed the relevant data, performed the target localization, and determined all relevant factors for this patient’s simulation.

## 2023-09-26 ENCOUNTER — HOSPITAL ENCOUNTER (OUTPATIENT)
Dept: RADIATION ONCOLOGY | Facility: MEDICAL CENTER | Age: 68
End: 2023-09-26
Payer: MEDICARE

## 2023-09-26 LAB
CHEMOTHERAPY INFUSION START DATE: NORMAL
CHEMOTHERAPY RECORDS: 2.67
CHEMOTHERAPY RECORDS: 4005
CHEMOTHERAPY RECORDS: NORMAL
CHEMOTHERAPY RX CANCER: NORMAL
DATE 1ST CHEMO CANCER: NORMAL
RAD ONC ARIA COURSE LAST TREATMENT DATE: NORMAL
RAD ONC ARIA COURSE TREATMENT ELAPSED DAYS: NORMAL
RAD ONC ARIA REFERENCE POINT DOSAGE GIVEN TO DATE: 5.34
RAD ONC ARIA REFERENCE POINT DOSAGE GIVEN TO DATE: 5.34
RAD ONC ARIA REFERENCE POINT ID: NORMAL
RAD ONC ARIA REFERENCE POINT ID: NORMAL
RAD ONC ARIA REFERENCE POINT SESSION DOSAGE GIVEN: 2.67
RAD ONC ARIA REFERENCE POINT SESSION DOSAGE GIVEN: 2.67

## 2023-09-26 PROCEDURE — 77412 RADIATION TX DELIVERY LVL 3: CPT | Performed by: RADIOLOGY

## 2023-09-27 ENCOUNTER — OFFICE VISIT (OUTPATIENT)
Dept: SURGERY | Facility: MEDICAL CENTER | Age: 68
End: 2023-09-27
Payer: MEDICARE

## 2023-09-27 ENCOUNTER — HOSPITAL ENCOUNTER (OUTPATIENT)
Dept: RADIATION ONCOLOGY | Facility: MEDICAL CENTER | Age: 68
End: 2023-09-27

## 2023-09-27 VITALS
SYSTOLIC BLOOD PRESSURE: 155 MMHG | OXYGEN SATURATION: 95 % | DIASTOLIC BLOOD PRESSURE: 100 MMHG | BODY MASS INDEX: 29.08 KG/M2 | TEMPERATURE: 98.1 F | WEIGHT: 158 LBS | HEART RATE: 93 BPM | HEIGHT: 62 IN

## 2023-09-27 VITALS — HEART RATE: 91 BPM | DIASTOLIC BLOOD PRESSURE: 84 MMHG | SYSTOLIC BLOOD PRESSURE: 137 MMHG | OXYGEN SATURATION: 98 %

## 2023-09-27 DIAGNOSIS — Z17.0 MALIGNANT NEOPLASM OF UPPER-OUTER QUADRANT OF RIGHT BREAST IN FEMALE, ESTROGEN RECEPTOR POSITIVE (HCC): ICD-10-CM

## 2023-09-27 DIAGNOSIS — C50.411 MALIGNANT NEOPLASM OF UPPER-OUTER QUADRANT OF RIGHT BREAST IN FEMALE, ESTROGEN RECEPTOR POSITIVE (HCC): ICD-10-CM

## 2023-09-27 LAB
CHEMOTHERAPY INFUSION START DATE: NORMAL
CHEMOTHERAPY RECORDS: 2.67
CHEMOTHERAPY RECORDS: 4005
CHEMOTHERAPY RECORDS: NORMAL
CHEMOTHERAPY RX CANCER: NORMAL
DATE 1ST CHEMO CANCER: NORMAL
RAD ONC ARIA COURSE LAST TREATMENT DATE: NORMAL
RAD ONC ARIA COURSE TREATMENT ELAPSED DAYS: NORMAL
RAD ONC ARIA REFERENCE POINT DOSAGE GIVEN TO DATE: 8.01
RAD ONC ARIA REFERENCE POINT DOSAGE GIVEN TO DATE: 8.01
RAD ONC ARIA REFERENCE POINT ID: NORMAL
RAD ONC ARIA REFERENCE POINT ID: NORMAL
RAD ONC ARIA REFERENCE POINT SESSION DOSAGE GIVEN: 2.67
RAD ONC ARIA REFERENCE POINT SESSION DOSAGE GIVEN: 2.67

## 2023-09-27 PROCEDURE — 3077F SYST BP >= 140 MM HG: CPT | Performed by: SURGERY

## 2023-09-27 PROCEDURE — 1126F AMNT PAIN NOTED NONE PRSNT: CPT | Performed by: SURGERY

## 2023-09-27 PROCEDURE — 99024 POSTOP FOLLOW-UP VISIT: CPT | Performed by: SURGERY

## 2023-09-27 PROCEDURE — 3080F DIAST BP >= 90 MM HG: CPT | Performed by: SURGERY

## 2023-09-27 PROCEDURE — 77412 RADIATION TX DELIVERY LVL 3: CPT | Performed by: RADIOLOGY

## 2023-09-27 PROCEDURE — 77336 RADIATION PHYSICS CONSULT: CPT | Performed by: RADIOLOGY

## 2023-09-27 ASSESSMENT — PATIENT HEALTH QUESTIONNAIRE - PHQ9
CLINICAL INTERPRETATION OF PHQ2 SCORE: 1
5. POOR APPETITE OR OVEREATING: 0 - NOT AT ALL
SUM OF ALL RESPONSES TO PHQ QUESTIONS 1-9: 5

## 2023-09-27 ASSESSMENT — PAIN SCALES - GENERAL: PAINLEVEL: NO PAIN

## 2023-09-27 NOTE — PROGRESS NOTES
"9/27/2023  2:45 PM    Primary care provider:  PATTI Sarmiento  Medical oncologist: Dr. Fuentes  Radiation oncologist:  Dr. Kirby    CC: No chief complaint on file.       HPI: This very pleasant 68 y.o. female returns for routine postoperative appointment.  She just started radiation therapy Monday.  She gets occasional nerve like zings, but is otherwise doing well.      Allergies   Allergen Reactions    Iodine Contrast [Diagnostic X-Ray Materials] Swelling    Codeine     Iodine      No current outpatient medications on file.       Physical Exam:  BP (!) 155/100 (BP Location: Right arm, Patient Position: Sitting, BP Cuff Size: Large adult)   Pulse 93   Temp 36.7 °C (98.1 °F) (Temporal)   Ht 1.575 m (5' 2\")   Wt 71.7 kg (158 lb)   SpO2 95%   BMI 28.90 kg/m²     General: Animated, in good spirits.    Surgical site:  RIGHT circumareolar and axillary incisions are healing nicely.  Excellent contour and shape thus far.    See scanned diagram    1. Malignant neoplasm of upper-outer quadrant of right breast in female, estrogen receptor positive (HCC)            ASSESSMENT:  RIGHT upper outer (11:00) IDC. Pathologic Stage IA (pTlb pN0 MO). US biopsy [facility] 7/12/23.  SA VI loc RIGHT partial mastectomy with lattice, donut mastopexy, SLNB with ARM 8/8/23 [facility].  Declined genomic testing, planning adjuvant XRT (HF, no B, Dr. Kirby), then endocrine therapy (Dr. Fuentes)  8mm. MARGINS CLEAR. (7mm on US.)  Grade 1. L VI present.  ER >90%. AL 70%.  Ki-67 5%.  HER2 LOW/negative (2+ on IHC, FISH negative).  0 of 2 sentinel nodes.    Screening mammo 5/24/23 [facility]: Scattered FGD. Last prior mammo 2018.    FH noncontributory. No AJ heritage.    Quit vaping in 2020, quit cigarettes 2007.  20 alcohol per week.  BMI28.  Extremely anxious.  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    DISCUSSED:  I recommended that she attend the hospital lymphedema education " class and reminded her that symptoms can occur at any time, even years after surgery, so early detection and treatment is jay to best outcomes. I advised her to continue compression/sports bra through radiation therapy and discussed breast lymphedema.  She says she actually feels better with the bra on anyway.  I explained that in order to minimize significant skin dimpling/devoting from when the seroma is reabsorbed after lumpectomy, suturing is done to help support the excision site which can lead to firmness, or even a lump.  This should either remain stable or improve with time, but if this enlarges or changes in any way, she should seek medical evaluation.  I advised her to walk regularly, stretch daily to maintain ROM, and gradually resume normal exercise/activity as tolerated.  I also advised her to increase her intake of vegetables/produce and focus on learn protein sources      PLAN:  I asked her to call us in about 4 months to arrange a FU appointment in 6 months, and encouraged her to call with concerning changes in the interim.  However, if she prefers to streamline her appointments and decrease potential COVID exposure, as well as financial burden or other reasons, she may opt to continue FU with her medical oncologist and return here as needed.

## 2023-09-27 NOTE — ON TREATMENT VISIT
ON TREATMENT NOTE  RADIATION ONCOLOGY DEPARTMENT    Patient name:  Cheri Pacheco    Primary Physician:  PATTI Sarmiento MRN: [id number]  CSN: [id number]   Referring physician:  Natalie Brown M.D. : 1955, 68 y.o.     ENCOUNTER DATE:  23    DIAGNOSIS:    Carcinoma of upper-outer quadrant of right breast in female, estrogen receptor positive (HCC)  Staging form: Breast, AJCC 8th Edition  - Pathologic stage from 2023: Stage IA (pT1b, pN0, cM0, G1, ER+, NV+, HER2-) - Signed by Rony Kirby M.D. on 2023  Stage prefix: Initial diagnosis  Histologic grading system: 3 grade system      TREATMENT SUMMARY:  Aria Treatment Information          2023   Aria Course Treatment Dates   Course First Treatment Date 2023    Course Last Treatment Date 2023    Aria Treatment Summary   R_Breast  Plan from Course C1_RBreast   Fraction 3 of 15   Elapsed Course Days 2 @    Prescribed Fraction Dose 267 cGy   Prescribed Total Dose 4,005 cGy   R_Breast  Reference Point from Course C1_RBreast   Elapsed Course Days 2 @ [id number]   Session Dose 267 cGy   Total Dose 801 cGy   R_Breast CP  Reference Point from Course C1_RBreast   Elapsed Course Days 2 @ [id number]   Session Dose 267 cGy   Total Dose 801 cGy      Radiation Treatments       Active   Plans   R_Breast   Most recent treatment: Dose planned: 267 cGy (fraction 3 of 15 on 2023)   Total: Dose planned: 4,005 cGy   Elapsed Days: 2 @            Historical   No historical radiation treatments to show.               SUBJECTIVE:   Patient is doing well.  She does not have any changes she would attribute to her radiation.    VITAL SIGNS:    Encounter Vitals  Blood Pressure : 137/84  Pulse: 91  Pulse Oximetry: 98 %  Pain Score: No pain (zinging sharp pain right breast)      2023     2:10 PM 2023     8:07 AM   Pain Assessment   Pain Score NO PAIN 5=MODERATE P   Pain Loc  BREAST           PHYSICAL EXAM:  No erythema    TOXICITY      9/27/2023     2:11 PM   Toxicity Assessment   Toxicity Assessment Breast   Fatigue (lethargy, malaise, asthenia) None   Fever (in the absence of neutropenia) None   Radiation Dermatitis None   Lymphatics Normal   RT - Pain due to RT None   Dyspnea Normal         IMPRESSION:  Cancer Staging   Carcinoma of upper-outer quadrant of right breast in female, estrogen receptor positive (HCC)  Staging form: Breast, AJCC 8th Edition  - Pathologic stage from 8/17/2023: Stage IA (pT1b, pN0, cM0, G1, ER+, HI+, HER2-) - Signed by Rony Kirby M.D. on 8/17/2023      PLAN:  No change in treatment plan    Disposition:  Treatment plan reviewed. Questions answered. Continue therapy outlined.     Rony Kirby M.D.    No orders of the defined types were placed in this encounter.

## 2023-09-28 ENCOUNTER — HOSPITAL ENCOUNTER (OUTPATIENT)
Dept: RADIATION ONCOLOGY | Facility: MEDICAL CENTER | Age: 68
End: 2023-09-28
Payer: MEDICARE

## 2023-09-28 LAB
CHEMOTHERAPY INFUSION START DATE: NORMAL
CHEMOTHERAPY RECORDS: 2.67
CHEMOTHERAPY RECORDS: 4005
CHEMOTHERAPY RECORDS: NORMAL
CHEMOTHERAPY RX CANCER: NORMAL
DATE 1ST CHEMO CANCER: NORMAL
RAD ONC ARIA COURSE LAST TREATMENT DATE: NORMAL
RAD ONC ARIA COURSE TREATMENT ELAPSED DAYS: NORMAL
RAD ONC ARIA REFERENCE POINT DOSAGE GIVEN TO DATE: 10.68
RAD ONC ARIA REFERENCE POINT DOSAGE GIVEN TO DATE: 10.68
RAD ONC ARIA REFERENCE POINT ID: NORMAL
RAD ONC ARIA REFERENCE POINT ID: NORMAL
RAD ONC ARIA REFERENCE POINT SESSION DOSAGE GIVEN: 2.67
RAD ONC ARIA REFERENCE POINT SESSION DOSAGE GIVEN: 2.67

## 2023-09-28 PROCEDURE — 77412 RADIATION TX DELIVERY LVL 3: CPT | Performed by: RADIOLOGY

## 2023-09-29 ENCOUNTER — HOSPITAL ENCOUNTER (OUTPATIENT)
Dept: RADIATION ONCOLOGY | Facility: MEDICAL CENTER | Age: 68
End: 2023-09-29
Payer: MEDICARE

## 2023-09-29 LAB
CHEMOTHERAPY INFUSION START DATE: NORMAL
CHEMOTHERAPY RECORDS: 2.67
CHEMOTHERAPY RECORDS: 4005
CHEMOTHERAPY RECORDS: NORMAL
CHEMOTHERAPY RX CANCER: NORMAL
DATE 1ST CHEMO CANCER: NORMAL
RAD ONC ARIA COURSE LAST TREATMENT DATE: NORMAL
RAD ONC ARIA COURSE TREATMENT ELAPSED DAYS: NORMAL
RAD ONC ARIA REFERENCE POINT DOSAGE GIVEN TO DATE: 13.35
RAD ONC ARIA REFERENCE POINT DOSAGE GIVEN TO DATE: 13.35
RAD ONC ARIA REFERENCE POINT ID: NORMAL
RAD ONC ARIA REFERENCE POINT ID: NORMAL
RAD ONC ARIA REFERENCE POINT SESSION DOSAGE GIVEN: 2.67
RAD ONC ARIA REFERENCE POINT SESSION DOSAGE GIVEN: 2.67

## 2023-09-29 PROCEDURE — 77427 RADIATION TX MANAGEMENT X5: CPT | Performed by: RADIOLOGY

## 2023-09-29 PROCEDURE — 77412 RADIATION TX DELIVERY LVL 3: CPT | Performed by: RADIOLOGY

## 2023-10-02 ENCOUNTER — HOSPITAL ENCOUNTER (OUTPATIENT)
Dept: RADIATION ONCOLOGY | Facility: MEDICAL CENTER | Age: 68
End: 2023-10-31
Attending: RADIOLOGY
Payer: MEDICARE

## 2023-10-02 ENCOUNTER — HOSPITAL ENCOUNTER (OUTPATIENT)
Dept: RADIATION ONCOLOGY | Facility: MEDICAL CENTER | Age: 68
End: 2023-10-02
Payer: MEDICARE

## 2023-10-02 LAB
CHEMOTHERAPY INFUSION START DATE: NORMAL
CHEMOTHERAPY RECORDS: 2.67
CHEMOTHERAPY RECORDS: 4005
CHEMOTHERAPY RECORDS: NORMAL
CHEMOTHERAPY RX CANCER: NORMAL
DATE 1ST CHEMO CANCER: NORMAL
RAD ONC ARIA COURSE LAST TREATMENT DATE: NORMAL
RAD ONC ARIA COURSE TREATMENT ELAPSED DAYS: NORMAL
RAD ONC ARIA REFERENCE POINT DOSAGE GIVEN TO DATE: 16.02
RAD ONC ARIA REFERENCE POINT DOSAGE GIVEN TO DATE: 16.02
RAD ONC ARIA REFERENCE POINT ID: NORMAL
RAD ONC ARIA REFERENCE POINT ID: NORMAL
RAD ONC ARIA REFERENCE POINT SESSION DOSAGE GIVEN: 2.67
RAD ONC ARIA REFERENCE POINT SESSION DOSAGE GIVEN: 2.67

## 2023-10-02 PROCEDURE — 77417 THER RADIOLOGY PORT IMAGE(S): CPT | Performed by: RADIOLOGY

## 2023-10-02 PROCEDURE — 77412 RADIATION TX DELIVERY LVL 3: CPT | Performed by: RADIOLOGY

## 2023-10-03 ENCOUNTER — HOSPITAL ENCOUNTER (OUTPATIENT)
Dept: RADIATION ONCOLOGY | Facility: MEDICAL CENTER | Age: 68
End: 2023-10-03
Payer: MEDICARE

## 2023-10-03 LAB
CHEMOTHERAPY INFUSION START DATE: NORMAL
CHEMOTHERAPY RECORDS: 2.67
CHEMOTHERAPY RECORDS: 4005
CHEMOTHERAPY RECORDS: NORMAL
CHEMOTHERAPY RX CANCER: NORMAL
DATE 1ST CHEMO CANCER: NORMAL
RAD ONC ARIA COURSE LAST TREATMENT DATE: NORMAL
RAD ONC ARIA COURSE TREATMENT ELAPSED DAYS: NORMAL
RAD ONC ARIA REFERENCE POINT DOSAGE GIVEN TO DATE: 18.69
RAD ONC ARIA REFERENCE POINT DOSAGE GIVEN TO DATE: 18.69
RAD ONC ARIA REFERENCE POINT ID: NORMAL
RAD ONC ARIA REFERENCE POINT ID: NORMAL
RAD ONC ARIA REFERENCE POINT SESSION DOSAGE GIVEN: 2.67
RAD ONC ARIA REFERENCE POINT SESSION DOSAGE GIVEN: 2.67

## 2023-10-03 PROCEDURE — 77412 RADIATION TX DELIVERY LVL 3: CPT | Performed by: RADIOLOGY

## 2023-10-04 ENCOUNTER — HOSPITAL ENCOUNTER (OUTPATIENT)
Dept: RADIATION ONCOLOGY | Facility: MEDICAL CENTER | Age: 68
End: 2023-10-04
Payer: MEDICARE

## 2023-10-04 LAB
CHEMOTHERAPY INFUSION START DATE: NORMAL
CHEMOTHERAPY RECORDS: 2.67
CHEMOTHERAPY RECORDS: 4005
CHEMOTHERAPY RECORDS: NORMAL
CHEMOTHERAPY RX CANCER: NORMAL
DATE 1ST CHEMO CANCER: NORMAL
RAD ONC ARIA COURSE LAST TREATMENT DATE: NORMAL
RAD ONC ARIA COURSE TREATMENT ELAPSED DAYS: NORMAL
RAD ONC ARIA REFERENCE POINT DOSAGE GIVEN TO DATE: 21.36
RAD ONC ARIA REFERENCE POINT DOSAGE GIVEN TO DATE: 21.36
RAD ONC ARIA REFERENCE POINT ID: NORMAL
RAD ONC ARIA REFERENCE POINT ID: NORMAL
RAD ONC ARIA REFERENCE POINT SESSION DOSAGE GIVEN: 2.67
RAD ONC ARIA REFERENCE POINT SESSION DOSAGE GIVEN: 2.67

## 2023-10-04 PROCEDURE — 77412 RADIATION TX DELIVERY LVL 3: CPT | Performed by: RADIOLOGY

## 2023-10-04 PROCEDURE — 77336 RADIATION PHYSICS CONSULT: CPT | Performed by: RADIOLOGY

## 2023-10-04 NOTE — ON TREATMENT VISIT
ON TREATMENT NOTE  RADIATION ONCOLOGY DEPARTMENT    Patient name:  Cheri Pacheco    Primary Physician:  PATTI Sarmiento MRN: [id number]  [facility]: [id number]   Referring physician:  Natalie Brown M.D. : 1955, 68 y.o.     ENCOUNTER DATE:  10/04/23    DIAGNOSIS:    Carcinoma of upper-outer quadrant of right breast in female, estrogen receptor positive (HCC)  Staging form: Breast, AJCC 8th Edition  - Pathologic stage from 2023: Stage IA (pT1b, pN0, cM0, G1, ER+, W[place]+, HER2-) - Signed by Rony Kirby M.D. on 2023  Stage prefix: Initial diagnosis  Histologic grading system: 3 grade system      TREATMENT SUMMARY:  Aria Treatment Information          10/4/2023   Aria Course Treatment Dates   Course First Treatment Date 2023    Course Last Treatment Date 10/04/2023    Aria Treatment Summary   R_Breast  Plan from Course C1_RBreast   Fraction 8 of 15   Elapsed Course Days 9 @    Prescribed Fraction Dose 267 cGy   Prescribed Total Dose 4,005 cGy   R_Breast  Reference Point from Course C1_RBreast   Elapsed Course Days 9 @ [id number]   Session Dose 267 cGy   Total Dose 2,136 cGy   R_Breast CP  Reference Point from Course C1_RBreast   Elapsed Course Days 9 @ [id number]   Session Dose 267 cGy   Total Dose 2,136 cGy      Radiation Treatments       Active   Plans   R_Breast   Most recent treatment: Dose planned: 267 cGy (fraction 8 of 15 on 10/4/2023)   Total: Dose planned: 4,005 cGy   Elapsed Days: 9 @            Historical   No historical radiation treatments to show.               SUBJECTIVE:   Patient's time had been changed from the previous day to an earlier time.  Unfortunately this means the staff did not recognize her time or room her in a timely manner.  This caused her appropriate frustration.  I have talked to the staff about verifying the time the morning of on treatment day.  This has been reiterated through our manager.    VITAL SIGNS:            9/27/2023     2:10 PM 8/21/2023     8:07 AM   Pain Assessment   Pain Score NO PAIN 5=MODERATE P   Pain Loc  BREAST          PHYSICAL EXAM:  No erythema    TOXICITY      9/27/2023     2:11 PM   Toxicity Assessment   Toxicity Assessment Breast   Fatigue (lethargy, malaise, asthenia) None   Fever (in the absence of neutropenia) None   Radiation Dermatitis None   Lymphatics Normal   RT - Pain due to RT None   Dyspnea Normal         IMPRESSION:  Cancer Staging   Carcinoma of upper-outer quadrant of right breast in female, estrogen receptor positive (HCC)  Staging form: Breast, AJCC 8th Edition  - Pathologic stage from 8/17/2023: Stage IA (pT1b, pN0, cM0, G1, ER+, WA+, HER2-) - Signed by Rony Kirby M.D. on 8/17/2023      PLAN:  No change in treatment plan    Disposition:  Treatment plan reviewed. Questions answered. Continue therapy outlined.     Rony Kirby M.D.    No orders of the defined types were placed in this encounter.

## 2023-10-05 ENCOUNTER — HOSPITAL ENCOUNTER (OUTPATIENT)
Dept: RADIATION ONCOLOGY | Facility: MEDICAL CENTER | Age: 68
End: 2023-10-05
Payer: MEDICARE

## 2023-10-05 LAB
CHEMOTHERAPY INFUSION START DATE: NORMAL
CHEMOTHERAPY RECORDS: 2.67
CHEMOTHERAPY RECORDS: 4005
CHEMOTHERAPY RECORDS: NORMAL
CHEMOTHERAPY RX CANCER: NORMAL
DATE 1ST CHEMO CANCER: NORMAL
RAD ONC ARIA COURSE LAST TREATMENT DATE: NORMAL
RAD ONC ARIA COURSE TREATMENT ELAPSED DAYS: NORMAL
RAD ONC ARIA REFERENCE POINT DOSAGE GIVEN TO DATE: 24.03
RAD ONC ARIA REFERENCE POINT DOSAGE GIVEN TO DATE: 24.03
RAD ONC ARIA REFERENCE POINT ID: NORMAL
RAD ONC ARIA REFERENCE POINT ID: NORMAL
RAD ONC ARIA REFERENCE POINT SESSION DOSAGE GIVEN: 2.67
RAD ONC ARIA REFERENCE POINT SESSION DOSAGE GIVEN: 2.67

## 2023-10-05 PROCEDURE — 77412 RADIATION TX DELIVERY LVL 3: CPT | Performed by: RADIOLOGY

## 2023-10-06 ENCOUNTER — TELEPHONE (OUTPATIENT)
Dept: HEMATOLOGY ONCOLOGY | Facility: MEDICAL CENTER | Age: 68
End: 2023-10-06
Payer: MEDICARE

## 2023-10-06 ENCOUNTER — HOSPITAL ENCOUNTER (OUTPATIENT)
Dept: RADIATION ONCOLOGY | Facility: MEDICAL CENTER | Age: 68
End: 2023-10-06

## 2023-10-06 LAB
CHEMOTHERAPY INFUSION START DATE: NORMAL
CHEMOTHERAPY RECORDS: 2.67
CHEMOTHERAPY RECORDS: 4005
CHEMOTHERAPY RECORDS: NORMAL
CHEMOTHERAPY RX CANCER: NORMAL
DATE 1ST CHEMO CANCER: NORMAL
RAD ONC ARIA COURSE LAST TREATMENT DATE: NORMAL
RAD ONC ARIA COURSE TREATMENT ELAPSED DAYS: NORMAL
RAD ONC ARIA REFERENCE POINT DOSAGE GIVEN TO DATE: 26.7
RAD ONC ARIA REFERENCE POINT DOSAGE GIVEN TO DATE: 26.7
RAD ONC ARIA REFERENCE POINT ID: NORMAL
RAD ONC ARIA REFERENCE POINT ID: NORMAL
RAD ONC ARIA REFERENCE POINT SESSION DOSAGE GIVEN: 2.67
RAD ONC ARIA REFERENCE POINT SESSION DOSAGE GIVEN: 2.67

## 2023-10-06 PROCEDURE — 77412 RADIATION TX DELIVERY LVL 3: CPT | Performed by: RADIOLOGY

## 2023-10-06 PROCEDURE — 77427 RADIATION TX MANAGEMENT X5: CPT | Performed by: RADIOLOGY

## 2023-10-06 NOTE — TELEPHONE ENCOUNTER
LATE ENTRY    Attempted to call patient on 10/4 due to experience she had in Radiation Oncology earlier that morning. Patient did not answer her phone, left a voicemail with my direct extension requesting a call back to discuss.

## 2023-10-09 ENCOUNTER — HOSPITAL ENCOUNTER (OUTPATIENT)
Dept: RADIATION ONCOLOGY | Facility: MEDICAL CENTER | Age: 68
End: 2023-10-09
Payer: MEDICARE

## 2023-10-09 LAB
CHEMOTHERAPY INFUSION START DATE: NORMAL
CHEMOTHERAPY RECORDS: 2.67
CHEMOTHERAPY RECORDS: 4005
CHEMOTHERAPY RECORDS: NORMAL
CHEMOTHERAPY RX CANCER: NORMAL
DATE 1ST CHEMO CANCER: NORMAL
RAD ONC ARIA COURSE LAST TREATMENT DATE: NORMAL
RAD ONC ARIA COURSE TREATMENT ELAPSED DAYS: NORMAL
RAD ONC ARIA REFERENCE POINT DOSAGE GIVEN TO DATE: 29.37
RAD ONC ARIA REFERENCE POINT DOSAGE GIVEN TO DATE: 29.37
RAD ONC ARIA REFERENCE POINT ID: NORMAL
RAD ONC ARIA REFERENCE POINT ID: NORMAL
RAD ONC ARIA REFERENCE POINT SESSION DOSAGE GIVEN: 2.67
RAD ONC ARIA REFERENCE POINT SESSION DOSAGE GIVEN: 2.67

## 2023-10-09 PROCEDURE — 77412 RADIATION TX DELIVERY LVL 3: CPT | Performed by: RADIOLOGY

## 2023-10-09 PROCEDURE — 77417 THER RADIOLOGY PORT IMAGE(S): CPT | Performed by: RADIOLOGY

## 2023-10-10 ENCOUNTER — HOSPITAL ENCOUNTER (OUTPATIENT)
Dept: RADIATION ONCOLOGY | Facility: MEDICAL CENTER | Age: 68
End: 2023-10-10
Payer: MEDICARE

## 2023-10-10 LAB
CHEMOTHERAPY INFUSION START DATE: NORMAL
CHEMOTHERAPY RECORDS: 2.67
CHEMOTHERAPY RECORDS: 4005
CHEMOTHERAPY RECORDS: NORMAL
CHEMOTHERAPY RX CANCER: NORMAL
DATE 1ST CHEMO CANCER: NORMAL
RAD ONC ARIA COURSE LAST TREATMENT DATE: NORMAL
RAD ONC ARIA COURSE TREATMENT ELAPSED DAYS: NORMAL
RAD ONC ARIA REFERENCE POINT DOSAGE GIVEN TO DATE: 32.04
RAD ONC ARIA REFERENCE POINT DOSAGE GIVEN TO DATE: 32.04
RAD ONC ARIA REFERENCE POINT ID: NORMAL
RAD ONC ARIA REFERENCE POINT ID: NORMAL
RAD ONC ARIA REFERENCE POINT SESSION DOSAGE GIVEN: 2.67
RAD ONC ARIA REFERENCE POINT SESSION DOSAGE GIVEN: 2.67

## 2023-10-10 PROCEDURE — 77412 RADIATION TX DELIVERY LVL 3: CPT | Performed by: RADIOLOGY

## 2023-10-11 ENCOUNTER — HOSPITAL ENCOUNTER (OUTPATIENT)
Dept: RADIATION ONCOLOGY | Facility: MEDICAL CENTER | Age: 68
End: 2023-10-11
Payer: MEDICARE

## 2023-10-11 VITALS
WEIGHT: 156.09 LBS | OXYGEN SATURATION: 94 % | SYSTOLIC BLOOD PRESSURE: 180 MMHG | HEART RATE: 86 BPM | BODY MASS INDEX: 28.55 KG/M2 | DIASTOLIC BLOOD PRESSURE: 83 MMHG

## 2023-10-11 LAB
CHEMOTHERAPY INFUSION START DATE: NORMAL
CHEMOTHERAPY RECORDS: 2.67
CHEMOTHERAPY RECORDS: 4005
CHEMOTHERAPY RECORDS: NORMAL
CHEMOTHERAPY RX CANCER: NORMAL
DATE 1ST CHEMO CANCER: NORMAL
RAD ONC ARIA COURSE LAST TREATMENT DATE: NORMAL
RAD ONC ARIA COURSE TREATMENT ELAPSED DAYS: NORMAL
RAD ONC ARIA REFERENCE POINT DOSAGE GIVEN TO DATE: 34.71
RAD ONC ARIA REFERENCE POINT DOSAGE GIVEN TO DATE: 34.71
RAD ONC ARIA REFERENCE POINT ID: NORMAL
RAD ONC ARIA REFERENCE POINT ID: NORMAL
RAD ONC ARIA REFERENCE POINT SESSION DOSAGE GIVEN: 2.67
RAD ONC ARIA REFERENCE POINT SESSION DOSAGE GIVEN: 2.67

## 2023-10-11 PROCEDURE — 77336 RADIATION PHYSICS CONSULT: CPT | Performed by: RADIOLOGY

## 2023-10-11 PROCEDURE — 77412 RADIATION TX DELIVERY LVL 3: CPT | Performed by: RADIOLOGY

## 2023-10-11 ASSESSMENT — PAIN SCALES - GENERAL: PAINLEVEL: NO PAIN

## 2023-10-11 NOTE — ON TREATMENT VISIT
ON TREATMENT NOTE  RADIATION ONCOLOGY DEPARTMENT    Patient name:  Cheri Pacheco    Primary Physician:  PATTI Sarmiento MRN: [id number]  [facility]: [id number]   Referring physician:  Natalie Brown M.D. : 1955, 68 y.o.     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 on 2023  Stage prefix: Initial diagnosis  Histologic grading system: 3 grade system      TREATMENT SUMMARY:  Aria Treatment Information          10/11/2023   Aria Course Treatment Dates   Course First Treatment Date 2023    Course Last Treatment Date 10/11/2023    Aria Treatment Summary   R_Breast  Plan from Course C1_RBreast   Fraction 13 of 15   Elapsed Course Days 16 @    Prescribed Fraction Dose 267 cGy   Prescribed Total Dose 4,005 cGy   R_Breast  Reference Point from Course C1_RBreast   Elapsed Course Days 16 @ [id number]   Session Dose 267 cGy   Total Dose 3,471 cGy   R_Breast CP  Reference Point from Course C1_RBreast   Elapsed Course Days 16 @    Session Dose 267 cGy   Total Dose 3,471 cGy      Radiation Treatments       Active   Plans   R_Breast   Most recent treatment: Dose planned: 267 cGy (fraction 13 of 15 on 10/11/2023)   Total: Dose planned: 4,005 cGy   Elapsed Days: 16 @ [id number]           Historical   No historical radiation treatments to show.               SUBJECTIVE:   Patient is doing well.  She does not have any changes she would attribute to her radiation.  The only symptom she is experiencing is some postsurgical pain in the seroma cavity.    VITAL SIGNS:    Encounter Vitals  Blood Pressure : (!) 180/83  Pulse: 86  Pulse Oximetry: 94 %  Weight: 70.8 kg (156 lb 1.4 oz)  Pain Score: No pain (shooting pain from  surgery)      10/11/2023     8:22 AM 9/27/2023     2:10 PM 8/21/2023     8:07 AM   Pain Assessment   Pain Score NO PAIN NO PAIN 5=MODERATE P   Pain Loc   BREAST          PHYSICAL EXAM:  No erythema    TOXICITY      10/11/2023     8:24 AM 9/27/2023     2:11 PM   Toxicity Assessment   Toxicity Assessment Breast Breast   Fatigue (lethargy, malaise, asthenia) Increased fatigue over baseline, but not altering normal activities None   Fever (in the absence of neutropenia) None None   Radiation Dermatitis Faint erythema or dry desquamation None   Lymphatics Normal Normal   RT - Pain due to RT None None   Dyspnea Normal Normal         IMPRESSION:  Cancer Staging   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 on 8/17/2023      PLAN:  No change in treatment plan    Disposition:  Treatment plan reviewed. Questions answered. Continue therapy outlined.     Rony Kirby M.D.    No orders of the defined types were placed in this encounter.

## 2023-10-12 ENCOUNTER — HOSPITAL ENCOUNTER (OUTPATIENT)
Dept: RADIATION ONCOLOGY | Facility: MEDICAL CENTER | Age: 68
End: 2023-10-12
Payer: MEDICARE

## 2023-10-12 LAB
CHEMOTHERAPY INFUSION START DATE: NORMAL
CHEMOTHERAPY RECORDS: 2.67
CHEMOTHERAPY RECORDS: 4005
CHEMOTHERAPY RECORDS: NORMAL
CHEMOTHERAPY RX CANCER: NORMAL
DATE 1ST CHEMO CANCER: NORMAL
RAD ONC ARIA COURSE LAST TREATMENT DATE: NORMAL
RAD ONC ARIA COURSE TREATMENT ELAPSED DAYS: NORMAL
RAD ONC ARIA REFERENCE POINT DOSAGE GIVEN TO DATE: 37.38
RAD ONC ARIA REFERENCE POINT DOSAGE GIVEN TO DATE: 37.38
RAD ONC ARIA REFERENCE POINT ID: NORMAL
RAD ONC ARIA REFERENCE POINT ID: NORMAL
RAD ONC ARIA REFERENCE POINT SESSION DOSAGE GIVEN: 2.67
RAD ONC ARIA REFERENCE POINT SESSION DOSAGE GIVEN: 2.67

## 2023-10-12 PROCEDURE — 77412 RADIATION TX DELIVERY LVL 3: CPT | Performed by: RADIOLOGY

## 2023-10-13 ENCOUNTER — HOSPITAL ENCOUNTER (OUTPATIENT)
Dept: RADIATION ONCOLOGY | Facility: MEDICAL CENTER | Age: 68
End: 2023-10-13

## 2023-10-13 LAB
CHEMOTHERAPY INFUSION START DATE: NORMAL
CHEMOTHERAPY INFUSION START DATE: NORMAL
CHEMOTHERAPY INFUSION STOP DATE: NORMAL
CHEMOTHERAPY RECORDS: 2.67
CHEMOTHERAPY RECORDS: 2.67
CHEMOTHERAPY RECORDS: 4005
CHEMOTHERAPY RECORDS: 4005
CHEMOTHERAPY RECORDS: NORMAL
CHEMOTHERAPY RX CANCER: NORMAL
CHEMOTHERAPY RX CANCER: NORMAL
DATE 1ST CHEMO CANCER: NORMAL
DATE 1ST CHEMO CANCER: NORMAL
RAD ONC ARIA COURSE LAST TREATMENT DATE: NORMAL
RAD ONC ARIA COURSE LAST TREATMENT DATE: NORMAL
RAD ONC ARIA COURSE TREATMENT ELAPSED DAYS: NORMAL
RAD ONC ARIA COURSE TREATMENT ELAPSED DAYS: NORMAL
RAD ONC ARIA REFERENCE POINT DOSAGE GIVEN TO DATE: 40.05
RAD ONC ARIA REFERENCE POINT ID: NORMAL
RAD ONC ARIA REFERENCE POINT SESSION DOSAGE GIVEN: 2.67
RAD ONC ARIA REFERENCE POINT SESSION DOSAGE GIVEN: 2.67

## 2023-10-13 PROCEDURE — 77412 RADIATION TX DELIVERY LVL 3: CPT | Performed by: RADIOLOGY

## 2023-10-13 PROCEDURE — 77427 RADIATION TX MANAGEMENT X5: CPT | Performed by: RADIOLOGY

## 2023-10-13 NOTE — RADIATION COMPLETION NOTES
END OF TREATMENT SUMMARY    Patient name:  Cheri Pacheco    Primary Physician:  PATTI Sarmiento MRN: [id number]  [facility]: [id number]   Referring physician:  Dr. Brown : 1955, 68 y.o.       TREATMENT SUMMARY:        Course First Treatment Date 2023    Course Last Treatment Date 10/13/2023   Course Elapsed Days 18 @ [id number]   Course Intent Curative     Radiation Therapy Episodes       Active Episodes       Radiation Therapy: 3D CRT    Radiation Therapy: 3D CRT                   Radiation Treatments         Plan Last Treated On Elapsed Days Fractions Treated Prescribed Fraction Dose (cGy) Prescribed Total Dose (cGy)    R_Breast 10/13/2023 18 @ [id number] 15 of 15 267 4,005                  Reference Point Last Treated On Elapsed Days Most Recent Session Dose (cGy) Total Dose (cGy)    R_Breast 10/13/2023 18 @ [id number] -- 4,005    R_Breast CP 10/13/2023 18 @ [id number] -- ,005                                     STAGE:   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 on 2023  Stage prefix: Initial diagnosis  Histologic grading system: 3 grade system       TREATMENT INDICATION:   Breast conservation therapy      CONCURRENT SYSTEMIC TREATMENT:   None     RT COURSE DISCONTINUED EARLY:   No     PATIENT EXPERIENCE:       10/11/2023     8:24 AM 2023     2:11 PM   Toxicity Assessment   Toxicity Assessment Breast Breast   Fatigue (lethargy, malaise, asthenia) Increased fatigue over baseline, but not altering normal activities None   Fever (in the absence of neutropenia) None None   Radiation Dermatitis Faint erythema or dry desquamation None   Lymphatics Normal Normal   RT - Pain due to RT None None   Dyspnea Normal Normal        FOLLOW-UP PLAN:   6 Weeks     COMMENT:           ANATOMIC TARGET SUMMARY    ANATOMIC TARGET MODALITY TECHNIQUE   Right breast   External beam, photons 3D Conformal            COMMENT:         DIAGRAMS:      DOSE VOLUME HISTOGRAMS:

## 2023-11-24 ASSESSMENT — LIFESTYLE VARIABLES
SMOKING_STATUS: NO
TOBACCO_USE: NO

## 2023-11-27 ENCOUNTER — HOSPITAL ENCOUNTER (OUTPATIENT)
Dept: RADIATION ONCOLOGY | Facility: MEDICAL CENTER | Age: 68
End: 2023-11-30
Attending: RADIOLOGY
Payer: MEDICARE

## 2023-11-27 NOTE — PROGRESS NOTES
"Telephone Appointment Visit   This telephone visit was initiated by the patient and they verbally consented.    Reason for Call:  Symptom Follow-up    HPI:    Stage IA ([id number]) G1 invasive ductal carcinoma of the right upper outer quadrant of breast ER/IL positive HER2/mark negative with a Ki-67 of 5% status post lumpectomy and sentinel lymph node biopsy on 8/8/2023 completing whole breast radiotherapy to 4005 cGy in October 2023 currently taking anastrozole    Patient states after completing radiation she did have more hyperpigmentation and even some \"blisters\" for roughly 2 days.  She now feels she is approaching her baseline.  She has started on her anastrozole.  She states the first 2 weeks she had some challenges with remembering to take the medicine but now is on a more scheduled routine.  She feels she is tolerating them well.    Labs / Images Reviewed:   none     Assessment and Plan:     While patient continues to follow in medical oncology I will release her from active follow-up in radiation oncology.  Patient understands should she have any questions or concerns at any time however she should feel free to contact me.    Follow-up: as needed    Total Time Spent (mins): 10    Rony Kirby M.D.   "

## 2024-01-30 ENCOUNTER — TELEPHONE (OUTPATIENT)
Dept: SURGERY | Facility: MEDICAL CENTER | Age: 69
End: 2024-01-30
Payer: MEDICARE

## 2024-01-30 NOTE — TELEPHONE ENCOUNTER
Spoke with very duncan Hernandez and she wanted to know if she needed to schedule a FU appt with Dr Brown. Her surgery was back in July. After reading Dr Brown's last office note I informed her that if she is feeling well and had no concerns regarding her surgery, the FU appt is completely up to her. She is following up with her medical oncologist and does not feel the need for the appt at this time.     JOSEFINA

## 2024-03-11 ENCOUNTER — TELEPHONE (OUTPATIENT)
Dept: SURGERY | Facility: MEDICAL CENTER | Age: 69
End: 2024-03-11
Payer: MEDICARE

## 2024-03-11 NOTE — TELEPHONE ENCOUNTER
Patient is doing well from surgery and has no concerns or questions. She has finished radiation. She will continue to follow up with Dr. Fuentes. Patient declines [facility] appointment with Dr. Brown. She is aware if she has any concerns or questions; she can call the office.

## 2025-03-20 ENCOUNTER — APPOINTMENT (OUTPATIENT)
Dept: RADIOLOGY | Facility: MEDICAL CENTER | Age: 70
End: 2025-03-20
Attending: INTERNAL MEDICINE
Payer: MEDICARE

## 2025-04-02 ENCOUNTER — APPOINTMENT (OUTPATIENT)
Dept: RADIOLOGY | Facility: MEDICAL CENTER | Age: 70
End: 2025-04-02
Attending: INTERNAL MEDICINE
Payer: MEDICARE

## 2025-04-09 ENCOUNTER — HOSPITAL ENCOUNTER (OUTPATIENT)
Facility: MEDICAL CENTER | Age: 70
End: 2025-04-09
Attending: STUDENT IN AN ORGANIZED HEALTH CARE EDUCATION/TRAINING PROGRAM
Payer: MEDICARE

## 2025-04-09 ENCOUNTER — OFFICE VISIT (OUTPATIENT)
Dept: URGENT CARE | Facility: CLINIC | Age: 70
End: 2025-04-09
Payer: MEDICARE

## 2025-04-09 VITALS
OXYGEN SATURATION: 96 % | HEART RATE: 85 BPM | DIASTOLIC BLOOD PRESSURE: 82 MMHG | RESPIRATION RATE: 16 BRPM | TEMPERATURE: 98.2 F | SYSTOLIC BLOOD PRESSURE: 130 MMHG

## 2025-04-09 DIAGNOSIS — Z20.2 SEXUALLY TRANSMITTED DISEASE EXPOSURE: ICD-10-CM

## 2025-04-09 DIAGNOSIS — Z11.3 SCREENING EXAMINATION FOR STI: ICD-10-CM

## 2025-04-09 DIAGNOSIS — N89.8 VAGINAL DISCHARGE: ICD-10-CM

## 2025-04-09 DIAGNOSIS — R30.0 DYSURIA: ICD-10-CM

## 2025-04-09 DIAGNOSIS — A64 SEXUALLY TRANSMITTED INFECTION: ICD-10-CM

## 2025-04-09 LAB
APPEARANCE UR: NORMAL
BILIRUB UR STRIP-MCNC: NORMAL MG/DL
CANDIDA DNA VAG QL PROBE+SIG AMP: NEGATIVE
COLOR UR AUTO: NORMAL
G VAGINALIS DNA VAG QL PROBE+SIG AMP: NEGATIVE
GLUCOSE UR STRIP.AUTO-MCNC: NEGATIVE MG/DL
KETONES UR STRIP.AUTO-MCNC: NORMAL MG/DL
LEUKOCYTE ESTERASE UR QL STRIP.AUTO: NORMAL
NITRITE UR QL STRIP.AUTO: POSITIVE
PH UR STRIP.AUTO: 6 [PH] (ref 5–8)
PROT UR QL STRIP: NORMAL MG/DL
RBC UR QL AUTO: NORMAL
SP GR UR STRIP.AUTO: 1.01
T VAGINALIS DNA VAG QL PROBE+SIG AMP: NEGATIVE
UROBILINOGEN UR STRIP-MCNC: NORMAL MG/DL

## 2025-04-09 PROCEDURE — 81002 URINALYSIS NONAUTO W/O SCOPE: CPT | Performed by: STUDENT IN AN ORGANIZED HEALTH CARE EDUCATION/TRAINING PROGRAM

## 2025-04-09 PROCEDURE — 99214 OFFICE O/P EST MOD 30 MIN: CPT | Performed by: STUDENT IN AN ORGANIZED HEALTH CARE EDUCATION/TRAINING PROGRAM

## 2025-04-09 PROCEDURE — 87660 TRICHOMONAS VAGIN DIR PROBE: CPT

## 2025-04-09 PROCEDURE — 87480 CANDIDA DNA DIR PROBE: CPT

## 2025-04-09 PROCEDURE — 87086 URINE CULTURE/COLONY COUNT: CPT

## 2025-04-09 PROCEDURE — 87510 GARDNER VAG DNA DIR PROBE: CPT

## 2025-04-09 RX ORDER — NITROFURANTOIN 25; 75 MG/1; MG/1
100 CAPSULE ORAL 2 TIMES DAILY
Qty: 10 CAPSULE | Refills: 0 | Status: SHIPPED | OUTPATIENT
Start: 2025-04-09 | End: 2025-04-14

## 2025-04-09 RX ORDER — ERGOCALCIFEROL 1.25 MG/1
50000 CAPSULE, LIQUID FILLED ORAL
COMMUNITY
Start: 2025-03-31

## 2025-04-09 ASSESSMENT — ENCOUNTER SYMPTOMS: FLANK PAIN: 0

## 2025-04-09 NOTE — PROGRESS NOTES
Subjective:   Cheri Pacheco is a 70 y.o. female who presents for Vaginal Discharge (Vaginal itching, new partner, x 10-14 days )      HPI:  70-year-old female who presents with vaginal discharge for the past 10 to 14 days as well as a few days of increased urinary frequency urgency and darker urine.  - She reports some vaginal irritation and itching and very mild pain but no vaginal bleeding.  She reports the discharge is clear/white but lots of it.  - She reports a new sexual partner just prior to the symptoms starting she concerned she may have a sexually transmitted infection.  - She reports no other new partners or exposures prior to that.  - She denies any fevers or chills denies any back pain.  She denies any abdominal pain.  No nausea no vomiting.    Review of Systems   Genitourinary:  Positive for dysuria, frequency and urgency. Negative for flank pain and hematuria.        Vaginal discharge       Medications:    vitamin D2 (Ergocalciferol) Caps    Allergies: Iodine contrast [diagnostic x-ray materials], Codeine, and Iodine    Problem List: Cheri Pacheco does not have any pertinent problems on file.    Surgical History:  Past Surgical History:   Procedure Laterality Date    PB MASTECTOMY, PARTIAL Right 8/8/2023    Procedure: NUPUR LOCALIZED RIGHT PARTIAL MASTECTOMY, MASTOPEXY, RIGHT SENTINEL LYMPH NODE INJECTION WITH EXCISION OF AXILLARY NODES;  Surgeon: Natalie Brown M.D.;  Location: SURGERY SAME DAY [facility] [place];  Service: General    VT SUSPENSION OF BREAST Right 8/8/2023    Procedure: MASTOPEXY;  Surgeon: Natalie Brown M.D.;  Location: SURGERY SAME DAY [facility] [place];  Service: General    NODE BIOPSY SENTINEL Right 8/8/2023    Procedure: BIOPSY, LYMPH NODE, SENTINEL;  Surgeon: Natalie Brown M.D.;  Location: SURGERY SAME DAY [facility] [place];  Service: General    ABDOMINAL HYSTERECTOMY TOTAL         Past Social Hx: Cheri Pacheco  reports that she quit smoking about 12 years ago. Her  smoking use included cigarettes. She started smoking about 12 years ago. She has never used smokeless tobacco. She reports current alcohol use. She reports current drug use. Drugs: Marijuana and Inhaled.     Past Family Hx:  Cheri Pacheco family history is not on file.     Problem list, medications, and allergies reviewed by myself today in Epic.     Objective:     /82   Pulse 85   Temp 36.8 °C (98.2 °F)   Resp 16   SpO2 96%     Physical Exam  Constitutional:       Appearance: Normal appearance.   HENT:      Head: Normocephalic and atraumatic.   Cardiovascular:      Rate and Rhythm: Normal rate and regular rhythm.      Pulses: Normal pulses.      Heart sounds: Normal heart sounds.   Pulmonary:      Breath sounds: Normal breath sounds.   Abdominal:      General: There is no distension.      Tenderness: There is no abdominal tenderness. There is no right CVA tenderness or left CVA tenderness.   Neurological:      Mental Status: She is alert.       Assessment/Plan:     Diagnosis and associated orders:     1. Vaginal discharge  VAGINAL PATHOGENS DNA PANEL    RPR (SYPHILIS)    HIV AG/AB COMBO ASSAY SCREENING    Chlamydia/GC, PCR (Genital/Anal swab)    CANCELED: Chlamydia/GC, PCR (Urine)      2. Dysuria  POCT Urinalysis    URINE CULTURE(NEW)    nitrofurantoin (MACROBID) 100 MG Cap      3. Screening examination for STI  VAGINAL PATHOGENS DNA PANEL    RPR (SYPHILIS)    HIV AG/AB COMBO ASSAY SCREENING    Chlamydia/GC, PCR (Genital/Anal swab)    CANCELED: Chlamydia/GC, PCR (Urine)         Comments/MDM:     1. Vaginal discharge  3. Screening examination for STI  Patient presents with 10 to 14 days of vaginal discharge.  After sexual intercourse.  Like to be tested for STIs.  Will run HIV RPR gonorrhea and chlamydia swab as well as a vaginal pathogen's panel.  - I will contact the patient with results of these testing and any further treatment as necessary.  - VAGINAL PATHOGENS DNA PANEL; Future  - RPR  (SYPHILIS); Future  - HIV AG/AB COMBO ASSAY SCREENING; Future  - Chlamydia/GC, PCR (Genital/Anal swab); Future    2. Dysuria  Patient presents with dysuria for the past 2 to 3 days during episodes of discharge.  Urinalysis positive for nitrates, leuks, trace blood protein.  - Will treat with Macrobid as below and send for urine culture.  - Patient denies any symptoms of systemic illness such as fever chills or flank or back pain.  - Patient to return to clinic if any worsening symptoms of dysuria, development of fever chills flank pain or back pain.  - POCT Urinalysis  - URINE CULTURE(NEW); Future  - nitrofurantoin (MACROBID) 100 MG Cap; Take 1 Capsule by mouth 2 times a day for 5 days.  Dispense: 10 Capsule; Refill: 0             Differential diagnosis, natural history, supportive care, and indications for immediate follow-up discussed.    Advised the patient to follow-up with the primary care physician for recheck, reevaluation, and consideration of further management.    Please note that this dictation was created using voice recognition software. I have made a reasonable attempt to correct obvious errors, but I expect that there are errors of grammar and possibly content that I did not discover before finalizing the note.    Forrest Benoit M.D.

## 2025-04-11 ENCOUNTER — APPOINTMENT (OUTPATIENT)
Dept: RADIOLOGY | Facility: MEDICAL CENTER | Age: 70
End: 2025-04-11
Attending: INTERNAL MEDICINE
Payer: OTHER GOVERNMENT

## 2025-04-11 LAB
BACTERIA UR CULT: NORMAL
SIGNIFICANT IND 70042: NORMAL
SITE SITE: NORMAL
SOURCE SOURCE: NORMAL

## 2025-04-16 LAB
FORWARD REASON: SPWHY: NORMAL
FORWARDED TO LAB: SPWHR: NORMAL
SPECIMEN SENT: SPWT1: NORMAL

## 2025-04-21 LAB
C TRACH RRNA SPEC QL NAA+PROBE: NORMAL
LABORATORY REPORT: NORMAL
Lab: NORMAL
N GONORRHOEA RRNA SPEC QL NAA+PROBE: NORMAL
REQUEST PROBLEM   100552: NORMAL

## (undated) DEVICE — CLOSURE SKIN STRIP 1/2 X 4 IN - (STERI STRIP) (50/BX 4BX/CA)

## (undated) DEVICE — SUCTION INSTRUMENT YANKAUER BULBOUS TIP W/O VENT (50EA/CA)

## (undated) DEVICE — SPONGE GAUZESTER. 2X2 4-PL - (2/PK 50PK/BX 30BX/CS)

## (undated) DEVICE — DRESSING TRANSPARENT FILM TEGADERM 4 X 4.75" (50EA/BX)"

## (undated) DEVICE — BINDER BREAST FLORAL PINK LARGE 36-40 (1EA)"

## (undated) DEVICE — Device

## (undated) DEVICE — LACTATED RINGERS INJ 1000 ML - (14EA/CA 60CA/PF)

## (undated) DEVICE — MASK AIRWAY FLEXIBLE SINGLE-USE SIZE 4 ADULTS (10EA/BX)

## (undated) DEVICE — BLADE ELECTRODE COATED EDGE (50EA/PK)

## (undated) DEVICE — GLOVE BIOGEL SZ 6 PF LATEX - (50EA/BX 4BX/CA)

## (undated) DEVICE — PAD MAGNETIC INSTRUMENT FOAM HOLDER (200/CA)

## (undated) DEVICE — COVER SHEATH PROBE FOR SAVI SCOUT (20EA/BX)

## (undated) DEVICE — SUTURE 4-0 MONOCRYL PLUS PS-2 - 27 INCH (36/BX)

## (undated) DEVICE — SODIUM CHL IRRIGATION 0.9% 1000ML (12EA/CA)

## (undated) DEVICE — MASK OXYGEN VNYL ADLT MED CONC WITH 7 FOOT TUBING  - (50EA/CA)

## (undated) DEVICE — PLUMEPEN ULTRA 3/8 IN X 10 FT HOSE (20EA/CA)

## (undated) DEVICE — SLEEVE VASO CALF MED - (10PR/CA)

## (undated) DEVICE — KIT  I.V. START (100EA/CA)

## (undated) DEVICE — GOWN WARMING STANDARD FLEX - (30/CA)

## (undated) DEVICE — FIBERWIRE 4-0 - (12/BX)

## (undated) DEVICE — COVER CIV-FLEX TRANSDUCER - (24/BX)

## (undated) DEVICE — TUBE CONNECTING SUCTION - CLEAR PLASTIC STERILE 72 IN (50EA/CA)

## (undated) DEVICE — SET LEADWIRE 5 LEAD BEDSIDE DISPOSABLE ECG (1SET OF 5/EA)

## (undated) DEVICE — TUBING CLEARLINK DUO-VENT - C-FLO (48EA/CA)

## (undated) DEVICE — SHEET TRANSVERSE LAP - (12EA/CA)

## (undated) DEVICE — GLOVE BIOGEL INDICATOR SZ 6.5 SURGICAL PF LTX - (50PR/BX 4BX/CA)

## (undated) DEVICE — CANNULA O2 COMFORT SOFT EAR ADULT 7 FT TUBING (50/CA)

## (undated) DEVICE — TOWEL STOP TIMEOUT SAFETY FLAG (40EA/CA)

## (undated) DEVICE — SUTURE 3-0 VICRYL PLUS SH - 8X 18 INCH (12/BX)

## (undated) DEVICE — SUTURE GENERAL

## (undated) DEVICE — SUTURE 2-0 PDS II CT-2 - (36/BX)

## (undated) DEVICE — DRESSING TRANSPARENT FILM TEGADERM 2.375 X 2.75"  (100EA/BX)"

## (undated) DEVICE — SENSOR OXIMETER ADULT SPO2 RD SET (20EA/BX)

## (undated) DEVICE — CANISTER SUCTION 3000ML MECHANICAL FILTER AUTO SHUTOFF MEDI-VAC NONSTERILE LF DISP  (40EA/CA)

## (undated) DEVICE — CANISTER SUCTION RIGID RED 1500CC (40EA/CA)

## (undated) DEVICE — SUTURE 4-0 30CM STRATAFIX SPIRAL PS-2 (12EA/BX)

## (undated) DEVICE — CLIP APPLIER OPEN SMALL (6EA/BX)

## (undated) DEVICE — SHEAR HS FOCUS 9CM CVD - (6/BX)